# Patient Record
Sex: FEMALE | Race: WHITE | NOT HISPANIC OR LATINO | Employment: UNEMPLOYED | URBAN - NONMETROPOLITAN AREA
[De-identification: names, ages, dates, MRNs, and addresses within clinical notes are randomized per-mention and may not be internally consistent; named-entity substitution may affect disease eponyms.]

---

## 2019-07-01 ENCOUNTER — OFFICE VISIT (OUTPATIENT)
Dept: FAMILY MEDICINE CLINIC | Facility: CLINIC | Age: 24
End: 2019-07-01
Payer: COMMERCIAL

## 2019-07-01 VITALS
SYSTOLIC BLOOD PRESSURE: 118 MMHG | DIASTOLIC BLOOD PRESSURE: 68 MMHG | WEIGHT: 197 LBS | HEIGHT: 64 IN | BODY MASS INDEX: 33.63 KG/M2

## 2019-07-01 DIAGNOSIS — Z30.019 ENCOUNTER FOR INITIAL PRESCRIPTION OF CONTRACEPTIVES, UNSPECIFIED CONTRACEPTIVE: ICD-10-CM

## 2019-07-01 PROCEDURE — 99213 OFFICE O/P EST LOW 20 MIN: CPT | Performed by: FAMILY MEDICINE

## 2019-07-01 PROCEDURE — 3008F BODY MASS INDEX DOCD: CPT | Performed by: FAMILY MEDICINE

## 2019-07-01 PROCEDURE — 1036F TOBACCO NON-USER: CPT | Performed by: FAMILY MEDICINE

## 2019-07-01 RX ORDER — FLUOXETINE HYDROCHLORIDE 20 MG/5ML
20 LIQUID ORAL DAILY
COMMUNITY
End: 2019-07-01 | Stop reason: CLARIF

## 2019-07-01 RX ORDER — FLUOXETINE 20 MG/1
TABLET, FILM COATED ORAL
Qty: 15 TABLET | Refills: 0 | Status: SHIPPED | OUTPATIENT
Start: 2019-07-01 | End: 2020-02-19 | Stop reason: ALTCHOICE

## 2019-07-01 NOTE — PROGRESS NOTES
Assessment/Plan:  Patient is encouraged to visit with gynecology for some form of birth control in the interim were going to start her on every other day dose of Prozac so we can start a very slow weaning process in [place] she would conceive again    Problem List Items Addressed This Visit     None      Visit Diagnoses     Postpartum depression    -  Primary           Diagnoses and all orders for this visit:    Postpartum depression    Other orders  -     Discontinue: FLUoxetine (PROzac) 20 mg/5 mL solution; Take 20 mg by mouth daily        No problem-specific Assessment & Plan notes found for this encounter  Subjective:      Patient ID: Kenia Howell is a 27 y o  female  Patient is here to reestablish herself as a patient she moved to [place] she is on her 3rd child right now takes Prozac which was for postpartum depression with anxiety on has been on a daily dose not using any birth control I told her my concern would be that we start to wean the Prozac and K she would become pregnant again recommended on going to every other day Prozac dispense 15 pills also will get her set up with Gynecology for possible birth control      The following portions of the patient's history were reviewed and updated as appropriate:   She has a past medical history of Ear disease, Hearing loss, and Insomnia ,  does not have a problem list on file  ,   has a past surgical history that includes Tympanostomy tube placement (Right); TONSILECTOMY AND ADNOIDECTOMY; and EAR SURGERY (Left)  ,  family history includes Allergies in her mother; Diabetes type II in her paternal grandfather  ,   reports that she quit smoking about 6 years ago  She quit after 3 00 years of use  She has never used smokeless tobacco  She reports that she drank alcohol  She reports that she does not use drugs  ,  is allergic to augmentin [amoxicillin-pot clavulanate] and bactrim [sulfamethoxazole-trimethoprim]     No current outpatient medications on file       No current facility-administered medications for this visit  Review of Systems   Constitutional: Negative for activity change, appetite change, diaphoresis, fatigue and fever  HENT: Negative  Eyes: Negative  Respiratory: Negative for apnea, cough, chest tightness, shortness of breath and wheezing  Cardiovascular: Negative for chest pain, palpitations and leg swelling  Gastrointestinal: Negative for abdominal distention, abdominal pain, anal bleeding, constipation, diarrhea, nausea and vomiting  Endocrine: Negative for cold intolerance, heat intolerance, polydipsia, polyphagia and polyuria  Genitourinary: Negative for difficulty urinating, dysuria, flank pain, hematuria and urgency  Musculoskeletal: Negative for arthralgias, back pain, gait problem, joint swelling and myalgias  Skin: Negative for color change, rash and wound  Allergic/Immunologic: Negative for environmental allergies, food allergies and immunocompromised state  Neurological: Negative for dizziness, seizures, syncope, speech difficulty, numbness and headaches  Hematological: Negative for adenopathy  Does not bruise/bleed easily  Psychiatric/Behavioral: Positive for dysphoric mood  Negative for agitation, behavioral problems, hallucinations, sleep disturbance and suicidal ideas  Objective:  Vitals:    07/01/19 1548   BP: 118/68   BP Location: Right arm   Patient Position: Sitting   Cuff Size: Standard   Weight: 89 4 kg (197 lb)   Height: 5' 4" (1 626 m)     Body mass index is 33 81 kg/m²  Physical Exam   Constitutional: She is oriented to person, place, and time  She appears well-developed and well-nourished  No distress  HENT:   Head: Normocephalic  Right Ear: External ear normal    Left Ear: External ear normal    Nose: Nose normal    Mouth/Throat: Oropharynx is clear and moist    Eyes: Pupils are equal, round, and reactive to light  Conjunctivae and EOM are normal  Right eye exhibits no discharge  Left eye exhibits no discharge  No scleral icterus  Neck: Normal range of motion  No tracheal deviation present  No thyromegaly present  Cardiovascular: Normal rate, regular rhythm and normal heart sounds  Exam reveals no gallop and no friction rub  No murmur heard  Pulmonary/Chest: Effort normal and breath sounds normal  No respiratory distress  She has no wheezes  Abdominal: Soft  Bowel sounds are normal  She exhibits no mass  There is no tenderness  There is no guarding  Musculoskeletal: She exhibits no edema or deformity  Lymphadenopathy:     She has no cervical adenopathy  Neurological: She is alert and oriented to person, place, and time  No cranial nerve deficit  Skin: Skin is warm and dry  No rash noted  She is not diaphoretic  No erythema  Psychiatric: She has a normal mood and affect   Thought content normal

## 2019-11-15 ENCOUNTER — VBI (OUTPATIENT)
Dept: FAMILY MEDICINE CLINIC | Facility: CLINIC | Age: 24
End: 2019-11-15

## 2020-02-19 ENCOUNTER — TELEPHONE (OUTPATIENT)
Dept: SLEEP CENTER | Facility: CLINIC | Age: 25
End: 2020-02-19

## 2020-02-19 ENCOUNTER — LAB (OUTPATIENT)
Dept: LAB | Facility: MEDICAL CENTER | Age: 25
End: 2020-02-19
Payer: COMMERCIAL

## 2020-02-19 ENCOUNTER — OFFICE VISIT (OUTPATIENT)
Dept: FAMILY MEDICINE CLINIC | Facility: CLINIC | Age: 25
End: 2020-02-19
Payer: COMMERCIAL

## 2020-02-19 VITALS
WEIGHT: 219.4 LBS | SYSTOLIC BLOOD PRESSURE: 128 MMHG | HEIGHT: 64 IN | DIASTOLIC BLOOD PRESSURE: 86 MMHG | BODY MASS INDEX: 37.46 KG/M2

## 2020-02-19 DIAGNOSIS — H81.4 VERTIGO OF CENTRAL ORIGIN: ICD-10-CM

## 2020-02-19 DIAGNOSIS — J11.1 INFLUENZA: ICD-10-CM

## 2020-02-19 DIAGNOSIS — Z12.4 ENCOUNTER FOR SCREENING FOR CERVICAL CANCER: Primary | ICD-10-CM

## 2020-02-19 DIAGNOSIS — R42 DIZZINESS: ICD-10-CM

## 2020-02-19 DIAGNOSIS — H55.09 LATERAL NYSTAGMUS: ICD-10-CM

## 2020-02-19 DIAGNOSIS — Z13.220 SCREENING FOR HYPERLIPIDEMIA: ICD-10-CM

## 2020-02-19 DIAGNOSIS — H66.90 CHRONIC OTITIS MEDIA, UNSPECIFIED OTITIS MEDIA TYPE: ICD-10-CM

## 2020-02-19 DIAGNOSIS — R53.82 CHRONIC FATIGUE: ICD-10-CM

## 2020-02-19 LAB
ALBUMIN SERPL BCP-MCNC: 3.7 G/DL (ref 3.5–5)
ALP SERPL-CCNC: 84 U/L (ref 46–116)
ALT SERPL W P-5'-P-CCNC: 40 U/L (ref 12–78)
ANION GAP SERPL CALCULATED.3IONS-SCNC: 4 MMOL/L (ref 4–13)
AST SERPL W P-5'-P-CCNC: 18 U/L (ref 5–45)
BILIRUB SERPL-MCNC: 0.18 MG/DL (ref 0.2–1)
BUN SERPL-MCNC: 10 MG/DL (ref 5–25)
CALCIUM SERPL-MCNC: 8.8 MG/DL (ref 8.3–10.1)
CHLORIDE SERPL-SCNC: 110 MMOL/L (ref 100–108)
CHOLEST SERPL-MCNC: 143 MG/DL (ref 50–200)
CO2 SERPL-SCNC: 27 MMOL/L (ref 21–32)
CREAT SERPL-MCNC: 0.67 MG/DL (ref 0.6–1.3)
ERYTHROCYTE [DISTWIDTH] IN BLOOD BY AUTOMATED COUNT: 13.9 % (ref 11.6–15.1)
GFR SERPL CREATININE-BSD FRML MDRD: 125 ML/MIN/1.73SQ M
GLUCOSE P FAST SERPL-MCNC: 77 MG/DL (ref 65–99)
HCT VFR BLD AUTO: 40.9 % (ref 34.8–46.1)
HDLC SERPL-MCNC: 35 MG/DL
HGB BLD-MCNC: 12.9 G/DL (ref 11.5–15.4)
LDLC SERPL CALC-MCNC: 92 MG/DL (ref 0–100)
MCH RBC QN AUTO: 29.3 PG (ref 26.8–34.3)
MCHC RBC AUTO-ENTMCNC: 31.5 G/DL (ref 31.4–37.4)
MCV RBC AUTO: 93 FL (ref 82–98)
NONHDLC SERPL-MCNC: 108 MG/DL
PLATELET # BLD AUTO: 284 THOUSANDS/UL (ref 149–390)
PMV BLD AUTO: 10.4 FL (ref 8.9–12.7)
POTASSIUM SERPL-SCNC: 4.3 MMOL/L (ref 3.5–5.3)
PROT SERPL-MCNC: 7.4 G/DL (ref 6.4–8.2)
RBC # BLD AUTO: 4.4 MILLION/UL (ref 3.81–5.12)
SODIUM SERPL-SCNC: 141 MMOL/L (ref 136–145)
TRIGL SERPL-MCNC: 81 MG/DL
TSH SERPL DL<=0.05 MIU/L-ACNC: 0.77 UIU/ML (ref 0.36–3.74)
WBC # BLD AUTO: 4.48 THOUSAND/UL (ref 4.31–10.16)

## 2020-02-19 PROCEDURE — 1036F TOBACCO NON-USER: CPT | Performed by: FAMILY MEDICINE

## 2020-02-19 PROCEDURE — 86618 LYME DISEASE ANTIBODY: CPT | Performed by: FAMILY MEDICINE

## 2020-02-19 PROCEDURE — 99213 OFFICE O/P EST LOW 20 MIN: CPT | Performed by: FAMILY MEDICINE

## 2020-02-19 PROCEDURE — 80061 LIPID PANEL: CPT | Performed by: FAMILY MEDICINE

## 2020-02-19 PROCEDURE — 87631 RESP VIRUS 3-5 TARGETS: CPT

## 2020-02-19 PROCEDURE — 85027 COMPLETE CBC AUTOMATED: CPT | Performed by: FAMILY MEDICINE

## 2020-02-19 PROCEDURE — 84443 ASSAY THYROID STIM HORMONE: CPT | Performed by: FAMILY MEDICINE

## 2020-02-19 PROCEDURE — 36415 COLL VENOUS BLD VENIPUNCTURE: CPT | Performed by: FAMILY MEDICINE

## 2020-02-19 PROCEDURE — 80053 COMPREHEN METABOLIC PANEL: CPT | Performed by: FAMILY MEDICINE

## 2020-02-19 NOTE — PROGRESS NOTES
BMI Counseling: Body mass index is 37 66 kg/m²  The BMI is above normal  Nutrition recommendations include decreasing portion sizes, encouraging healthy choices of fruits and vegetables, decreasing fast food intake, consuming healthier snacks, moderation in carbohydrate intake and increasing intake of lean protein  Exercise recommendations include moderate physical activity 150 minutes/week  No pharmacotherapy was ordered  Patient referred to PCP due to patient being overweight  Assessment/Plan:    Problem List Items Addressed This Visit     None      Visit Diagnoses     Encounter for screening for cervical cancer     -  Primary    Relevant Orders    Ambulatory referral to Gynecology    Dizziness               Diagnoses and all orders for this visit:    Encounter for screening for cervical cancer   -     Ambulatory referral to Gynecology; Future    Dizziness        No problem-specific Assessment & Plan notes found for this encounter  Subjective:      Patient ID: Emerson Gary is a 27 y o  female  Sheryl Laresgrant is here today with a couple of issues on she is dizzy she does not describe it as true vertigo just a sensation of dizziness which is frequent almost on a daily basis lasts about an hour not associated with hypoglycemia no recent head trauma but patient has just an off-balance sensation 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 no head trauma that she knows of on interestingly during the exam she has lateral nystagmus almost constantly during the exam      The following portions of the patient's history were reviewed and updated as appropriate:   She has a past medical history of Ear disease, Hearing loss, and Insomnia ,  does not have any pertinent problems on file  ,   has a past surgical history that includes Tympanostomy tube placement (Right); TONSILECTOMY AND ADNOIDECTOMY; and EAR SURGERY (Left)  ,  family history includes Allergies in her mother; Diabetes type II in her paternal grandfather  , reports that she quit smoking about 7 years ago  She quit after 3 00 years of use  She has never used smokeless tobacco  She reports that she drank alcohol  She reports that she does not use drugs  ,  is allergic to augmentin [amoxicillin-pot clavulanate]; bactrim [sulfamethoxazole-trimethoprim]; and coconut oil     No current outpatient medications on file  No current facility-administered medications for this visit  Review of Systems   Constitutional: Positive for activity change  Negative for appetite change, diaphoresis, fatigue and fever  HENT: Negative  Eyes: Negative  Respiratory: Negative for apnea, cough, chest tightness, shortness of breath and wheezing  Cardiovascular: Negative for chest pain, palpitations and leg swelling  Gastrointestinal: Negative for abdominal distention, abdominal pain, anal bleeding, constipation, diarrhea, nausea and vomiting  Endocrine: Negative for cold intolerance, heat intolerance, polydipsia, polyphagia and polyuria  Genitourinary: Negative for difficulty urinating, dysuria, flank pain, hematuria and urgency  Musculoskeletal: Negative for arthralgias, back pain, gait problem, joint swelling and myalgias  Skin: Negative for color change, rash and wound  Allergic/Immunologic: Negative for environmental allergies, food allergies and immunocompromised state  Neurological: Positive for dizziness  Negative for seizures, syncope, speech difficulty, numbness and headaches  Hematological: Negative for adenopathy  Does not bruise/bleed easily  Psychiatric/Behavioral: Negative for agitation, behavioral problems, hallucinations, sleep disturbance and suicidal ideas  Objective:  Vitals:    02/19/20 0949   BP: 128/86   BP Location: Left arm   Patient Position: Sitting   Weight: 99 5 kg (219 lb 6 4 oz)   Height: 5' 4" (1 626 m)     Body mass index is 37 66 kg/m²  Physical Exam   Constitutional: She is oriented to person, place, and time   She appears well-developed and well-nourished  No distress  HENT:   Head: Normocephalic  Right Ear: External ear normal    Left Ear: External ear normal    Nose: Nose normal    Mouth/Throat: Oropharynx is clear and moist    Eyes: Pupils are equal, round, and reactive to light  Conjunctivae and EOM are normal  Right eye exhibits no discharge  Left eye exhibits no discharge  No scleral icterus  Neck: Normal range of motion  No tracheal deviation present  No thyromegaly present  Cardiovascular: Normal rate, regular rhythm and normal heart sounds  Exam reveals no gallop and no friction rub  No murmur heard  Pulmonary/Chest: Effort normal and breath sounds normal  No respiratory distress  She has no wheezes  Abdominal: Soft  Bowel sounds are normal  She exhibits no mass  There is no tenderness  There is no guarding  Musculoskeletal: She exhibits no edema or deformity  Lymphadenopathy:     She has no cervical adenopathy  Neurological: She is alert and oriented to person, place, and time  No cranial nerve deficit  Skin: Skin is warm and dry  No rash noted  She is not diaphoretic  No erythema  Psychiatric: She has a normal mood and affect   Thought content normal

## 2020-02-19 NOTE — TELEPHONE ENCOUNTER
I called and left message for the patient to call the office back regarding about scheduling an appointment with ENT

## 2020-02-20 LAB
B BURGDOR IGG+IGM SER-ACNC: <0.91 ISR (ref 0–0.9)
FLUAV RNA NPH QL NAA+PROBE: ABNORMAL
FLUBV RNA NPH QL NAA+PROBE: DETECTED
RSV RNA NPH QL NAA+PROBE: ABNORMAL

## 2020-02-20 NOTE — RESULT ENCOUNTER NOTE
Call patient to notify normal results all the lab work so far is totally normal chemistry panel is good blood sugar is good liver function good kidney function good thyroid good cholesterol good

## 2020-02-26 ENCOUNTER — HOSPITAL ENCOUNTER (OUTPATIENT)
Dept: MRI IMAGING | Facility: HOSPITAL | Age: 25
Discharge: HOME | End: 2020-02-26
Attending: FAMILY MEDICINE
Payer: COMMERCIAL

## 2020-02-26 DIAGNOSIS — R42 DIZZINESS: ICD-10-CM

## 2020-02-26 PROCEDURE — 70551 MRI BRAIN STEM W/O DYE: CPT

## 2020-02-28 NOTE — RESULT ENCOUNTER NOTE
Call patient to notify normal results no abnormality of the brain there are some postsurgical changes of the left side of the skull but otherwise none absolutely normal

## 2020-04-13 ENCOUNTER — HOSPITAL ENCOUNTER (EMERGENCY)
Facility: HOSPITAL | Age: 25
Discharge: HOME | End: 2020-04-13
Attending: EMERGENCY MEDICINE | Admitting: EMERGENCY MEDICINE
Payer: COMMERCIAL

## 2020-04-13 VITALS
WEIGHT: 227.51 LBS | SYSTOLIC BLOOD PRESSURE: 134 MMHG | DIASTOLIC BLOOD PRESSURE: 61 MMHG | TEMPERATURE: 98.3 F | OXYGEN SATURATION: 98 % | RESPIRATION RATE: 20 BRPM | BODY MASS INDEX: 39.05 KG/M2 | HEART RATE: 77 BPM

## 2020-04-13 DIAGNOSIS — R10.2 PELVIC CRAMPING: ICD-10-CM

## 2020-04-13 DIAGNOSIS — N94.6 CRAMPY PAIN ASSOCIATED WITH MENSES: ICD-10-CM

## 2020-04-13 DIAGNOSIS — R10.9 ABDOMINAL PAIN: Primary | ICD-10-CM

## 2020-04-13 LAB
BACTERIA UR QL AUTO: ABNORMAL /HPF
BILIRUB UR QL STRIP: NEGATIVE
CLARITY UR: ABNORMAL
COLOR UR: ABNORMAL
EXT PREG TEST URINE: NEGATIVE
EXT. CONTROL ED NAV: NORMAL
GLUCOSE SERPL-MCNC: 90 MG/DL (ref 65–140)
GLUCOSE UR STRIP-MCNC: NEGATIVE MG/DL
HGB UR QL STRIP.AUTO: ABNORMAL
KETONES UR STRIP-MCNC: NEGATIVE MG/DL
LEUKOCYTE ESTERASE UR QL STRIP: NEGATIVE
NITRITE UR QL STRIP: NEGATIVE
NON-SQ EPI CELLS URNS QL MICRO: ABNORMAL /HPF
PH UR STRIP.AUTO: 7 [PH]
PROT UR STRIP-MCNC: ABNORMAL MG/DL
RBC #/AREA URNS AUTO: ABNORMAL /HPF
SP GR UR STRIP.AUTO: 1.02 (ref 1–1.03)
UROBILINOGEN UR QL STRIP.AUTO: 0.2 E.U./DL
WBC #/AREA URNS AUTO: ABNORMAL /HPF

## 2020-04-13 PROCEDURE — 99284 EMERGENCY DEPT VISIT MOD MDM: CPT

## 2020-04-13 PROCEDURE — 81001 URINALYSIS AUTO W/SCOPE: CPT | Performed by: EMERGENCY MEDICINE

## 2020-04-13 PROCEDURE — 82948 REAGENT STRIP/BLOOD GLUCOSE: CPT

## 2020-04-13 PROCEDURE — 87147 CULTURE TYPE IMMUNOLOGIC: CPT | Performed by: EMERGENCY MEDICINE

## 2020-04-13 PROCEDURE — 99284 EMERGENCY DEPT VISIT MOD MDM: CPT | Performed by: EMERGENCY MEDICINE

## 2020-04-13 PROCEDURE — 81025 URINE PREGNANCY TEST: CPT | Performed by: EMERGENCY MEDICINE

## 2020-04-13 PROCEDURE — 87086 URINE CULTURE/COLONY COUNT: CPT | Performed by: EMERGENCY MEDICINE

## 2020-04-13 RX ORDER — DICYCLOMINE HCL 20 MG
20 TABLET ORAL ONCE
Status: COMPLETED | OUTPATIENT
Start: 2020-04-13 | End: 2020-04-13

## 2020-04-13 RX ORDER — NAPROXEN 375 MG/1
375 TABLET ORAL 2 TIMES DAILY WITH MEALS
Qty: 6 TABLET | Refills: 0 | Status: SHIPPED | OUTPATIENT
Start: 2020-04-13 | End: 2020-12-16

## 2020-04-13 RX ORDER — ONDANSETRON 4 MG/1
4 TABLET, FILM COATED ORAL EVERY 12 HOURS PRN
Qty: 2 TABLET | Refills: 0 | Status: SHIPPED | OUTPATIENT
Start: 2020-04-13 | End: 2020-12-16

## 2020-04-13 RX ORDER — ONDANSETRON 4 MG/1
4 TABLET, ORALLY DISINTEGRATING ORAL ONCE
Status: COMPLETED | OUTPATIENT
Start: 2020-04-13 | End: 2020-04-13

## 2020-04-13 RX ADMIN — DICYCLOMINE HYDROCHLORIDE 20 MG: 20 TABLET ORAL at 11:34

## 2020-04-13 RX ADMIN — ONDANSETRON 4 MG: 4 TABLET, ORALLY DISINTEGRATING ORAL at 11:34

## 2020-04-14 LAB — BACTERIA UR CULT: ABNORMAL

## 2020-04-21 DIAGNOSIS — N30.00 ACUTE CYSTITIS WITHOUT HEMATURIA: Primary | ICD-10-CM

## 2020-04-21 RX ORDER — NITROFURANTOIN 25; 75 MG/1; MG/1
100 CAPSULE ORAL 2 TIMES DAILY
Qty: 10 CAPSULE | Refills: 0 | Status: SHIPPED | OUTPATIENT
Start: 2020-04-21 | End: 2020-04-26

## 2020-05-13 ENCOUNTER — TELEPHONE (OUTPATIENT)
Dept: NEUROLOGY | Facility: CLINIC | Age: 25
End: 2020-05-13

## 2020-05-14 ENCOUNTER — TELEPHONE (OUTPATIENT)
Dept: NEUROLOGY | Facility: CLINIC | Age: 25
End: 2020-05-14

## 2020-05-15 ENCOUNTER — CONSULT (OUTPATIENT)
Dept: NEUROLOGY | Facility: CLINIC | Age: 25
End: 2020-05-15
Payer: COMMERCIAL

## 2020-05-15 VITALS
HEART RATE: 89 BPM | RESPIRATION RATE: 18 BRPM | BODY MASS INDEX: 43.23 KG/M2 | HEIGHT: 61 IN | TEMPERATURE: 97.9 F | DIASTOLIC BLOOD PRESSURE: 72 MMHG | SYSTOLIC BLOOD PRESSURE: 134 MMHG | WEIGHT: 229 LBS

## 2020-05-15 DIAGNOSIS — R29.818 TRANSIENT NEUROLOGICAL SYMPTOMS: Primary | ICD-10-CM

## 2020-05-15 DIAGNOSIS — H91.90 HEARING LOSS, UNSPECIFIED HEARING LOSS TYPE, UNSPECIFIED LATERALITY: ICD-10-CM

## 2020-05-15 DIAGNOSIS — R42 DIZZINESS: ICD-10-CM

## 2020-05-15 PROCEDURE — 99243 OFF/OP CNSLTJ NEW/EST LOW 30: CPT | Performed by: PSYCHIATRY & NEUROLOGY

## 2020-05-15 PROCEDURE — 3008F BODY MASS INDEX DOCD: CPT | Performed by: PSYCHIATRY & NEUROLOGY

## 2020-05-26 ENCOUNTER — HOSPITAL ENCOUNTER (OUTPATIENT)
Dept: NEUROLOGY | Facility: HOSPITAL | Age: 25
Discharge: HOME | End: 2020-05-26
Attending: PSYCHIATRY & NEUROLOGY
Payer: COMMERCIAL

## 2020-05-26 DIAGNOSIS — R29.818 TRANSIENT NEUROLOGICAL SYMPTOMS: ICD-10-CM

## 2020-05-26 DIAGNOSIS — R42 DIZZINESS: ICD-10-CM

## 2020-05-26 PROCEDURE — 95819 EEG AWAKE AND ASLEEP: CPT

## 2020-05-26 PROCEDURE — 95819 EEG AWAKE AND ASLEEP: CPT | Performed by: PSYCHIATRY & NEUROLOGY

## 2020-06-03 ENCOUNTER — HOSPITAL ENCOUNTER (OUTPATIENT)
Dept: NEUROLOGY | Facility: CLINIC | Age: 25
Discharge: HOME | End: 2020-06-03
Payer: COMMERCIAL

## 2020-06-03 DIAGNOSIS — R42 DIZZINESS: ICD-10-CM

## 2020-06-03 DIAGNOSIS — H91.90 HEARING LOSS, UNSPECIFIED HEARING LOSS TYPE, UNSPECIFIED LATERALITY: ICD-10-CM

## 2020-06-03 PROCEDURE — 92585 PR AUDITORY EVOKED POTENTIAL: CPT | Performed by: PSYCHIATRY & NEUROLOGY

## 2020-06-03 PROCEDURE — 92585 HB AUDITOR EVOKE POTENT COMPRE: CPT

## 2020-06-11 ENCOUNTER — TELEPHONE (OUTPATIENT)
Dept: NEUROLOGY | Facility: CLINIC | Age: 25
End: 2020-06-11

## 2020-06-17 ENCOUNTER — TELEPHONE (OUTPATIENT)
Dept: NEUROLOGY | Facility: CLINIC | Age: 25
End: 2020-06-17

## 2020-12-15 ENCOUNTER — HOSPITAL ENCOUNTER (EMERGENCY)
Facility: HOSPITAL | Age: 25
Discharge: HOME | End: 2020-12-16
Attending: EMERGENCY MEDICINE
Payer: COMMERCIAL

## 2020-12-15 DIAGNOSIS — L50.9 URTICARIA: Primary | ICD-10-CM

## 2020-12-15 LAB
EXT PREG TEST URINE: NEGATIVE
EXT. CONTROL ED NAV: NORMAL

## 2020-12-15 PROCEDURE — 99283 EMERGENCY DEPT VISIT LOW MDM: CPT

## 2020-12-15 PROCEDURE — 99284 EMERGENCY DEPT VISIT MOD MDM: CPT | Performed by: EMERGENCY MEDICINE

## 2020-12-15 PROCEDURE — 96374 THER/PROPH/DIAG INJ IV PUSH: CPT

## 2020-12-15 PROCEDURE — 81025 URINE PREGNANCY TEST: CPT | Performed by: EMERGENCY MEDICINE

## 2020-12-15 PROCEDURE — 96375 TX/PRO/DX INJ NEW DRUG ADDON: CPT

## 2020-12-15 RX ORDER — DIPHENHYDRAMINE HYDROCHLORIDE 50 MG/ML
25 INJECTION INTRAMUSCULAR; INTRAVENOUS ONCE
Status: COMPLETED | OUTPATIENT
Start: 2020-12-15 | End: 2020-12-15

## 2020-12-15 RX ORDER — METHYLPREDNISOLONE SODIUM SUCCINATE 125 MG/2ML
80 INJECTION, POWDER, LYOPHILIZED, FOR SOLUTION INTRAMUSCULAR; INTRAVENOUS ONCE
Status: COMPLETED | OUTPATIENT
Start: 2020-12-15 | End: 2020-12-15

## 2020-12-15 RX ADMIN — FAMOTIDINE 20 MG: 10 INJECTION INTRAVENOUS at 23:13

## 2020-12-15 RX ADMIN — METHYLPREDNISOLONE SODIUM SUCCINATE 80 MG: 125 INJECTION, POWDER, FOR SOLUTION INTRAMUSCULAR; INTRAVENOUS at 23:16

## 2020-12-15 RX ADMIN — DIPHENHYDRAMINE HYDROCHLORIDE 25 MG: 50 INJECTION, SOLUTION INTRAMUSCULAR; INTRAVENOUS at 23:14

## 2020-12-16 ENCOUNTER — OFFICE VISIT (OUTPATIENT)
Dept: FAMILY MEDICINE CLINIC | Facility: CLINIC | Age: 25
End: 2020-12-16
Payer: COMMERCIAL

## 2020-12-16 VITALS
TEMPERATURE: 97.9 F | DIASTOLIC BLOOD PRESSURE: 58 MMHG | OXYGEN SATURATION: 98 % | WEIGHT: 231.48 LBS | HEART RATE: 77 BPM | BODY MASS INDEX: 39.52 KG/M2 | SYSTOLIC BLOOD PRESSURE: 125 MMHG | HEIGHT: 64 IN | RESPIRATION RATE: 16 BRPM

## 2020-12-16 VITALS
TEMPERATURE: 97.3 F | BODY MASS INDEX: 38.48 KG/M2 | HEART RATE: 83 BPM | DIASTOLIC BLOOD PRESSURE: 96 MMHG | WEIGHT: 225.4 LBS | SYSTOLIC BLOOD PRESSURE: 140 MMHG | OXYGEN SATURATION: 97 % | HEIGHT: 64 IN

## 2020-12-16 DIAGNOSIS — L50.9 URTICARIA: Primary | ICD-10-CM

## 2020-12-16 PROCEDURE — 99213 OFFICE O/P EST LOW 20 MIN: CPT | Performed by: PHYSICIAN ASSISTANT

## 2020-12-16 PROCEDURE — 3008F BODY MASS INDEX DOCD: CPT | Performed by: PHYSICIAN ASSISTANT

## 2020-12-16 PROCEDURE — 3725F SCREEN DEPRESSION PERFORMED: CPT | Performed by: PHYSICIAN ASSISTANT

## 2020-12-16 PROCEDURE — 1036F TOBACCO NON-USER: CPT | Performed by: PHYSICIAN ASSISTANT

## 2020-12-16 RX ORDER — PREDNISONE 20 MG/1
40 TABLET ORAL DAILY
Qty: 4 TABLET | Refills: 0 | Status: SHIPPED | OUTPATIENT
Start: 2020-12-16 | End: 2020-12-18

## 2020-12-17 ENCOUNTER — HOSPITAL ENCOUNTER (EMERGENCY)
Facility: HOSPITAL | Age: 25
Discharge: HOME | End: 2020-12-17
Attending: EMERGENCY MEDICINE | Admitting: EMERGENCY MEDICINE
Payer: COMMERCIAL

## 2020-12-17 DIAGNOSIS — L50.8 RECURRENT URTICARIA: Primary | ICD-10-CM

## 2020-12-17 LAB
ANION GAP SERPL CALCULATED.3IONS-SCNC: 8 MMOL/L (ref 4–13)
BASOPHILS # BLD AUTO: 0.04 THOUSANDS/ΜL (ref 0–0.1)
BASOPHILS NFR BLD AUTO: 0 % (ref 0–1)
BUN SERPL-MCNC: 10 MG/DL (ref 5–25)
CALCIUM SERPL-MCNC: 9.3 MG/DL (ref 8.3–10.1)
CHLORIDE SERPL-SCNC: 103 MMOL/L (ref 100–108)
CO2 SERPL-SCNC: 28 MMOL/L (ref 21–32)
CREAT SERPL-MCNC: 0.82 MG/DL (ref 0.6–1.3)
EOSINOPHIL # BLD AUTO: 0.02 THOUSAND/ΜL (ref 0–0.61)
EOSINOPHIL NFR BLD AUTO: 0 % (ref 0–6)
ERYTHROCYTE [DISTWIDTH] IN BLOOD BY AUTOMATED COUNT: 14.5 % (ref 11.6–15.1)
GFR SERPL CREATININE-BSD FRML MDRD: 101 ML/MIN/1.73SQ M
GLUCOSE SERPL-MCNC: 127 MG/DL (ref 65–140)
HCT VFR BLD AUTO: 40.7 % (ref 34.8–46.1)
HGB BLD-MCNC: 13 G/DL (ref 11.5–15.4)
IMM GRANULOCYTES # BLD AUTO: 0.05 THOUSAND/UL (ref 0–0.2)
IMM GRANULOCYTES NFR BLD AUTO: 0 % (ref 0–2)
LYMPHOCYTES # BLD AUTO: 2.66 THOUSANDS/ΜL (ref 0.6–4.47)
LYMPHOCYTES NFR BLD AUTO: 17 % (ref 14–44)
MAGNESIUM SERPL-MCNC: 2.2 MG/DL (ref 1.6–2.6)
MCH RBC QN AUTO: 29.6 PG (ref 26.8–34.3)
MCHC RBC AUTO-ENTMCNC: 31.9 G/DL (ref 31.4–37.4)
MCV RBC AUTO: 93 FL (ref 82–98)
MONOCYTES # BLD AUTO: 1.15 THOUSAND/ΜL (ref 0.17–1.22)
MONOCYTES NFR BLD AUTO: 7 % (ref 4–12)
NEUTROPHILS # BLD AUTO: 12.03 THOUSANDS/ΜL (ref 1.85–7.62)
NEUTS SEG NFR BLD AUTO: 75 % (ref 43–75)
NRBC BLD AUTO-RTO: 0 /100 WBCS
PLATELET # BLD AUTO: 417 THOUSANDS/UL (ref 149–390)
PMV BLD AUTO: 9.5 FL (ref 8.9–12.7)
POTASSIUM SERPL-SCNC: 3.7 MMOL/L (ref 3.5–5.3)
RBC # BLD AUTO: 4.39 MILLION/UL (ref 3.81–5.12)
SODIUM SERPL-SCNC: 139 MMOL/L (ref 136–145)
T4 FREE SERPL-MCNC: 1.05 NG/DL (ref 0.76–1.46)
TSH SERPL DL<=0.05 MIU/L-ACNC: 0.65 UIU/ML (ref 0.36–3.74)
WBC # BLD AUTO: 15.95 THOUSAND/UL (ref 4.31–10.16)

## 2020-12-17 PROCEDURE — 36415 COLL VENOUS BLD VENIPUNCTURE: CPT | Performed by: EMERGENCY MEDICINE

## 2020-12-17 PROCEDURE — 83735 ASSAY OF MAGNESIUM: CPT | Performed by: EMERGENCY MEDICINE

## 2020-12-17 PROCEDURE — 80048 BASIC METABOLIC PNL TOTAL CA: CPT | Performed by: EMERGENCY MEDICINE

## 2020-12-17 PROCEDURE — 99283 EMERGENCY DEPT VISIT LOW MDM: CPT

## 2020-12-17 PROCEDURE — 96374 THER/PROPH/DIAG INJ IV PUSH: CPT

## 2020-12-17 PROCEDURE — 84439 ASSAY OF FREE THYROXINE: CPT | Performed by: EMERGENCY MEDICINE

## 2020-12-17 PROCEDURE — 85025 COMPLETE CBC W/AUTO DIFF WBC: CPT | Performed by: EMERGENCY MEDICINE

## 2020-12-17 PROCEDURE — 99284 EMERGENCY DEPT VISIT MOD MDM: CPT | Performed by: EMERGENCY MEDICINE

## 2020-12-17 PROCEDURE — 96372 THER/PROPH/DIAG INJ SC/IM: CPT

## 2020-12-17 PROCEDURE — 84443 ASSAY THYROID STIM HORMONE: CPT | Performed by: EMERGENCY MEDICINE

## 2020-12-17 RX ORDER — EPINEPHRINE 1 MG/ML
INJECTION, SOLUTION, CONCENTRATE INTRAVENOUS
Status: COMPLETED
Start: 2020-12-17 | End: 2020-12-17

## 2020-12-17 RX ORDER — DIPHENHYDRAMINE HYDROCHLORIDE 50 MG/ML
INJECTION INTRAMUSCULAR; INTRAVENOUS
Status: COMPLETED
Start: 2020-12-17 | End: 2020-12-17

## 2020-12-17 RX ADMIN — DIPHENHYDRAMINE HYDROCHLORIDE 50 MG: 50 INJECTION, SOLUTION INTRAMUSCULAR; INTRAVENOUS at 01:40

## 2020-12-17 RX ADMIN — EPINEPHRINE 0.3 MG: 1 INJECTION, SOLUTION, CONCENTRATE INTRAVENOUS at 01:30

## 2020-12-21 ENCOUNTER — TELEPHONE (OUTPATIENT)
Dept: FAMILY MEDICINE CLINIC | Facility: CLINIC | Age: 25
End: 2020-12-21

## 2020-12-21 ENCOUNTER — OFFICE VISIT (OUTPATIENT)
Dept: FAMILY MEDICINE CLINIC | Facility: CLINIC | Age: 25
End: 2020-12-21
Payer: COMMERCIAL

## 2020-12-21 VITALS
OXYGEN SATURATION: 99 % | DIASTOLIC BLOOD PRESSURE: 78 MMHG | HEART RATE: 87 BPM | WEIGHT: 229 LBS | TEMPERATURE: 98.4 F | HEIGHT: 64 IN | SYSTOLIC BLOOD PRESSURE: 130 MMHG | BODY MASS INDEX: 39.09 KG/M2

## 2020-12-21 DIAGNOSIS — D72.829 LEUKOCYTOSIS, UNSPECIFIED TYPE: ICD-10-CM

## 2020-12-21 DIAGNOSIS — L50.9 URTICARIA: Primary | ICD-10-CM

## 2020-12-21 PROCEDURE — 99214 OFFICE O/P EST MOD 30 MIN: CPT | Performed by: PHYSICIAN ASSISTANT

## 2020-12-21 PROCEDURE — 3008F BODY MASS INDEX DOCD: CPT | Performed by: PHYSICIAN ASSISTANT

## 2020-12-21 PROCEDURE — 1036F TOBACCO NON-USER: CPT | Performed by: PHYSICIAN ASSISTANT

## 2021-04-04 ENCOUNTER — HOSPITAL ENCOUNTER (EMERGENCY)
Facility: HOSPITAL | Age: 26
Discharge: HOME | End: 2021-04-04
Attending: EMERGENCY MEDICINE
Payer: COMMERCIAL

## 2021-04-04 VITALS
OXYGEN SATURATION: 99 % | BODY MASS INDEX: 40.12 KG/M2 | DIASTOLIC BLOOD PRESSURE: 62 MMHG | WEIGHT: 235 LBS | RESPIRATION RATE: 18 BRPM | TEMPERATURE: 98.6 F | SYSTOLIC BLOOD PRESSURE: 126 MMHG | HEIGHT: 64 IN | HEART RATE: 79 BPM

## 2021-04-04 DIAGNOSIS — N39.0 UTI (URINARY TRACT INFECTION): Primary | ICD-10-CM

## 2021-04-04 LAB
ANION GAP SERPL CALCULATED.3IONS-SCNC: 11 MMOL/L (ref 4–13)
BACTERIA UR QL AUTO: ABNORMAL /HPF
BASOPHILS # BLD AUTO: 0.04 THOUSANDS/ΜL (ref 0–0.1)
BASOPHILS NFR BLD AUTO: 0 % (ref 0–1)
BILIRUB UR QL STRIP: NEGATIVE
BUN SERPL-MCNC: 10 MG/DL (ref 5–25)
CALCIUM SERPL-MCNC: 8.8 MG/DL (ref 8.3–10.1)
CHLORIDE SERPL-SCNC: 104 MMOL/L (ref 100–108)
CLARITY UR: CLEAR
CO2 SERPL-SCNC: 25 MMOL/L (ref 21–32)
COLOR UR: YELLOW
CREAT SERPL-MCNC: 0.61 MG/DL (ref 0.6–1.3)
EOSINOPHIL # BLD AUTO: 0.06 THOUSAND/ΜL (ref 0–0.61)
EOSINOPHIL NFR BLD AUTO: 1 % (ref 0–6)
ERYTHROCYTE [DISTWIDTH] IN BLOOD BY AUTOMATED COUNT: 14.5 % (ref 11.6–15.1)
GFR SERPL CREATININE-BSD FRML MDRD: 128 ML/MIN/1.73SQ M
GLUCOSE SERPL-MCNC: 78 MG/DL (ref 65–140)
GLUCOSE UR STRIP-MCNC: NEGATIVE MG/DL
HCT VFR BLD AUTO: 37.8 % (ref 34.8–46.1)
HGB BLD-MCNC: 12 G/DL (ref 11.5–15.4)
HGB UR QL STRIP.AUTO: NEGATIVE
IMM GRANULOCYTES # BLD AUTO: 0.04 THOUSAND/UL (ref 0–0.2)
IMM GRANULOCYTES NFR BLD AUTO: 0 % (ref 0–2)
KETONES UR STRIP-MCNC: NEGATIVE MG/DL
LEUKOCYTE ESTERASE UR QL STRIP: ABNORMAL
LYMPHOCYTES # BLD AUTO: 1.63 THOUSANDS/ΜL (ref 0.6–4.47)
LYMPHOCYTES NFR BLD AUTO: 17 % (ref 14–44)
MCH RBC QN AUTO: 29 PG (ref 26.8–34.3)
MCHC RBC AUTO-ENTMCNC: 31.7 G/DL (ref 31.4–37.4)
MCV RBC AUTO: 91 FL (ref 82–98)
MONOCYTES # BLD AUTO: 0.75 THOUSAND/ΜL (ref 0.17–1.22)
MONOCYTES NFR BLD AUTO: 8 % (ref 4–12)
NEUTROPHILS # BLD AUTO: 7.3 THOUSANDS/ΜL (ref 1.85–7.62)
NEUTS SEG NFR BLD AUTO: 74 % (ref 43–75)
NITRITE UR QL STRIP: NEGATIVE
NON-SQ EPI CELLS URNS QL MICRO: ABNORMAL /HPF
NRBC BLD AUTO-RTO: 0 /100 WBCS
PH UR STRIP.AUTO: 7.5 [PH]
PLATELET # BLD AUTO: 305 THOUSANDS/UL (ref 149–390)
PMV BLD AUTO: 10 FL (ref 8.9–12.7)
POTASSIUM SERPL-SCNC: 3.6 MMOL/L (ref 3.5–5.3)
PROT UR STRIP-MCNC: NEGATIVE MG/DL
RBC # BLD AUTO: 4.14 MILLION/UL (ref 3.81–5.12)
RBC #/AREA URNS AUTO: ABNORMAL /HPF
SODIUM SERPL-SCNC: 140 MMOL/L (ref 136–145)
SP GR UR STRIP.AUTO: 1.02 (ref 1–1.03)
UROBILINOGEN UR QL STRIP.AUTO: 0.2 E.U./DL
WBC # BLD AUTO: 9.82 THOUSAND/UL (ref 4.31–10.16)
WBC #/AREA URNS AUTO: ABNORMAL /HPF

## 2021-04-04 PROCEDURE — 87086 URINE CULTURE/COLONY COUNT: CPT | Performed by: EMERGENCY MEDICINE

## 2021-04-04 PROCEDURE — 80048 BASIC METABOLIC PNL TOTAL CA: CPT | Performed by: EMERGENCY MEDICINE

## 2021-04-04 PROCEDURE — 81001 URINALYSIS AUTO W/SCOPE: CPT | Performed by: EMERGENCY MEDICINE

## 2021-04-04 PROCEDURE — 85025 COMPLETE CBC W/AUTO DIFF WBC: CPT | Performed by: EMERGENCY MEDICINE

## 2021-04-04 PROCEDURE — 96360 HYDRATION IV INFUSION INIT: CPT

## 2021-04-04 PROCEDURE — 36415 COLL VENOUS BLD VENIPUNCTURE: CPT | Performed by: EMERGENCY MEDICINE

## 2021-04-04 PROCEDURE — 99283 EMERGENCY DEPT VISIT LOW MDM: CPT

## 2021-04-04 PROCEDURE — 99284 EMERGENCY DEPT VISIT MOD MDM: CPT | Performed by: EMERGENCY MEDICINE

## 2021-04-04 RX ADMIN — SODIUM CHLORIDE 1000 ML: 0.9 INJECTION, SOLUTION INTRAVENOUS at 15:13

## 2021-04-04 NOTE — ED PROVIDER NOTES
History  Chief Complaint   Patient presents with    Possible UTI     burning with urination started last night with nausea and vomitting; pt reports pregnant approx 10 weeks     HPI  Patient is  at 9 weeks by ultrasound date comes in with burning with urination that started last night, now with nausea vomiting  She has had a slight headache since that time is well  She denies any abdominal pain, denies any swelling of lower legs, denies any contractions, denies any vaginal discharge, denies any gush of fluid from her vagina with bleeding from her vagina  Denies any sick contacts, denies anyone else in the family that is also having nausea vomiting  Denies any history of morning sickness or hyperemesis of previous pregnancies  Patient Otherwise has no fevers chills or sweats denies any cough or other COVID-19 symptoms  None       Past Medical History:   Diagnosis Date    Ear disease     chronic cyst     Hearing loss     Insomnia     Last assessed 7/15/2014        Past Surgical History:   Procedure Laterality Date    EAR SURGERY Left     Foreign body removed     TONSILECTOMY AND ADNOIDECTOMY      TYMPANOSTOMY TUBE PLACEMENT Right        Family History   Problem Relation Age of Onset    Allergies Mother     Diabetes type II Paternal Grandfather      I have reviewed and agree with the history as documented  E-Cigarette/Vaping    E-Cigarette Use Never User      E-Cigarette/Vaping Substances     Social History     Tobacco Use    Smoking status: Former Smoker     Years: 3 00     Quit date:      Years since quittin 2    Smokeless tobacco: Never Used   Substance Use Topics    Alcohol use: Not Currently    Drug use: No       Review of Systems   Constitutional: Negative  Negative for chills and fever  HENT: Negative  Negative for congestion and sore throat  Eyes: Negative  Negative for discharge and redness  Respiratory: Negative    Negative for chest tightness and shortness of breath  Cardiovascular: Negative  Negative for chest pain and palpitations  Gastrointestinal: Positive for nausea and vomiting  Negative for abdominal pain  Endocrine: Negative  Negative for cold intolerance and polyphagia  Genitourinary: Positive for dysuria  Negative for difficulty urinating  Musculoskeletal: Negative  Negative for arthralgias and back pain  Skin: Negative  Negative for color change and wound  Allergic/Immunologic: Negative  Negative for environmental allergies  Neurological: Positive for headaches  Negative for dizziness and weakness  Hematological: Negative  Psychiatric/Behavioral: Negative  Negative for behavioral problems  The patient is not nervous/anxious  All other systems reviewed and are negative  Physical Exam  Physical Exam  Vitals signs and nursing note reviewed  Constitutional:       General: She is not in acute distress  Appearance: She is well-developed  She is not diaphoretic  HENT:      Head: Normocephalic and atraumatic  Right Ear: External ear normal       Left Ear: External ear normal       Nose: No congestion or rhinorrhea  Eyes:      General: No scleral icterus  Right eye: No discharge  Left eye: No discharge  Conjunctiva/sclera: Conjunctivae normal       Pupils: Pupils are equal, round, and reactive to light  Neck:      Musculoskeletal: Normal range of motion and neck supple  No neck rigidity or muscular tenderness  Thyroid: No thyromegaly  Trachea: No tracheal deviation  Cardiovascular:      Rate and Rhythm: Regular rhythm  Heart sounds: No murmur  No friction rub  No gallop  Pulmonary:      Effort: Pulmonary effort is normal  No respiratory distress  Breath sounds: Normal breath sounds  No stridor  No wheezing or rales  Abdominal:      General: Bowel sounds are normal  There is no distension  Palpations: Abdomen is soft  Tenderness:  There is no abdominal tenderness  There is no guarding or rebound  Musculoskeletal: Normal range of motion  General: No tenderness, deformity or signs of injury  Skin:     General: Skin is warm and dry  Findings: No erythema or rash  Neurological:      General: No focal deficit present  Mental Status: She is alert and oriented to person, place, and time  Cranial Nerves: No cranial nerve deficit  Psychiatric:         Behavior: Behavior normal          Thought Content:  Thought content normal          Vital Signs  ED Triage Vitals [04/04/21 1419]   Temperature Pulse Respirations Blood Pressure SpO2   98 6 °F (37 °C) 81 16 144/67 98 %      Temp Source Heart Rate Source Patient Position - Orthostatic VS BP Location FiO2 (%)   Temporal Monitor Sitting Right arm --      Pain Score       8           Vitals:    04/04/21 1419 04/04/21 1430   BP: 144/67 126/62   Pulse: 81 79   Patient Position - Orthostatic VS: Sitting Lying         Visual Acuity  Visual Acuity      Most Recent Value   L Pupil Size (mm)  3   R Pupil Size (mm)  3          ED Medications  Medications   sodium chloride 0 9 % bolus 1,000 mL (0 mL Intravenous Stopped 4/4/21 1605)       Diagnostic Studies  Results Reviewed     Procedure Component Value Units Date/Time    Basic metabolic panel [[id number]] Collected: 04/04/21 1513    Lab Status: Final result Specimen: Blood from Arm, Right Updated: 04/04/21 1528     Sodium 140 mmol/L      Potassium 3 6 mmol/L      Chloride 104 mmol/L      CO2 25 mmol/L      ANION GAP 11 mmol/L      BUN 10 mg/dL      Creatinine 0 61 mg/dL      Glucose 78 mg/dL      Calcium 8 8 mg/dL      eGFR 128 ml/min/1 73sq m     Narrative:      [place] guidelines for Chronic Kidney Disease (CKD):     Stage 1 with normal or high GFR (GFR > 90 mL/min/1 73 square meters)    Stage 2 Mild CKD (GFR = 60-89 mL/min/1 73 square meters)    Stage 3A Moderate CKD (GFR = 45-59 mL/min/1 73 square meters)    Stage 3B Moderate CKD (GFR = 30-44 mL/min/1 73 square meters)    Stage 4 Severe CKD (GFR = 15-29 mL/min/1 73 square meters)    Stage 5 End Stage CKD (GFR <15 mL/min/1 73 square meters)  Note: GFR calculation is accurate only with a steady state creatinine    CBC and differential [[id number]] Collected: 04/04/21 1513    Lab Status: Final result Specimen: Blood from Arm, Right Updated: 04/04/21 1520     WBC 9 82 Thousand/uL      RBC 4 14 Million/uL      Hemoglobin 12 0 g/dL      Hematocrit 37 8 %      MCV 91 fL      MCH 29 0 pg      MCHC 31 7 g/dL      RDW 14 5 %      MPV 10 0 fL      Platelets 663 Thousands/uL      nRBC 0 /100 WBCs      Neutrophils Relative 74 %      Immat GRANS % 0 %      Lymphocytes Relative 17 %      Monocytes Relative 8 %      Eosinophils Relative 1 %      Basophils Relative 0 %      Neutrophils Absolute 7 30 Thousands/µL      Immature Grans Absolute 0 04 Thousand/uL      Lymphocytes Absolute 1 63 Thousands/µL      Monocytes Absolute 0 75 Thousand/µL      Eosinophils Absolute 0 06 Thousand/µL      Basophils Absolute 0 04 Thousands/µL     Urine Microscopic [[id number]]  (Abnormal) Collected: 04/04/21 1501    Lab Status: Final result Specimen: Urine, Clean Catch Updated: 04/04/21 1515     RBC, UA 0-1 /hpf      WBC, UA 2-4 /hpf      Epithelial Cells Moderate /hpf      Bacteria, UA Occasional /hpf      URINE COMMENT --    UA w Reflex to Microscopic w Reflex to Culture [[id number]]  (Abnormal) Collected: 04/04/21 1501    Lab Status: Final result Specimen: Urine, Clean Catch Updated: 04/04/21 1507     Color, UA Yellow     Clarity, UA Clear     Specific [place], UA 1 020     pH, UA 7 5     Leukocytes, UA Trace     Nitrite, UA Negative     Protein, UA Negative mg/dl      Glucose, UA Negative mg/dl      Ketones, UA Negative mg/dl      Urobilinogen, UA 0 2 E U /dl      Bilirubin, UA Negative     Blood, UA Negative     URINE COMMENT --    Urine culture [[id number]] Collected: 04/04/21 1501    Lab Status:  In process Specimen: Urine, Clean Catch Updated: 21 1507                 No orders to display              Procedures  Procedures         ED Course  ED Course as of  010   Sun 2021   1503 ,  2d by US, no abdominal pain  Burning with urination, headache, nausea and vomiting  161 Patient still has a headache, she did take 1 g of Tylenol just prior to arrival   She has normal vital signs normal exam   Her labs are unremarkable she is not dehydrated  I did discuss using Unisom and B6 for her nausea vomiting  She does have a UA that shows trace leuks, occasional bacteria, moderate epithelial cells  Bacteria could be contamination from outside body however given the patient's symptoms of dysuria with the trace leuks will go ahead and treat  1617 Patient has negative urine protein, platelets are normal   I did defer liver enzymes as patient is only 9 weeks pregnant  SBIRT 22yo+      Most Recent Value   SBIRT (22 yo +)   In order to provide better care to our patients, we are screening all of our patients for alcohol and drug use  Would it be okay to ask you these screening questions? Yes Filed at: 2021   Initial Alcohol Screen: US AUDIT-C    1  How often do you have a drink containing alcohol?  0 Filed at: 2021   2  How many drinks containing alcohol do you have on a typical day you are drinking? 0 Filed at: 2021   3a  Male UNDER 65: How often do you have five or more drinks on one occasion? 0 Filed at: 2021   3b  FEMALE Any Age, or MALE 65+: How often do you have 4 or more drinks on one occassion? 0 Filed at: 2021   Audit-C Score  0 Filed at: 2021   MELO: How many times in the past year have you    Used an illegal drug or used a prescription medication for non-medical reasons?   Never Filed at: 2021                    MDM  Number of Diagnoses or Management Options  UTI (urinary tract infection):   Diagnosis management comments: Well-appearing nontoxic female comes in tightness with dysuria  She had Tylenol just prior to arrival   Will get some lab work make sure she is not dehydrated, fissures she is not a ptosis  Will give a L of fluid  Disposition  Final diagnoses:   UTI (urinary tract infection)     Time reflects when diagnosis was documented in both MDM as applicable and the Disposition within this note     Time User Action Codes Description Comment    4/4/2021  4:17 PM Jeff Paniagua Add [N39 0] UTI (urinary tract infection)       ED Disposition     ED Disposition Condition Date/Time Comment    Discharge Stable Sun Apr 4, 2021  4:17 PM Hadley Jeong discharge to home/self care  Follow-up Information     Follow up With Specialties Details Why Contact Info Additional Information    Blas Camacho,  Family Medicine Call in 1 day follow up being seen in the emergency department [address]  [facility] [address] [facility] Emergency Department Emergency Medicine Go to  As needed, If symptoms worsen [facility] [id number]  [address] [place] [address] Emergency Department[address], [address]          There are no discharge medications for this patient  No discharge procedures on file      PDMP Review     None          ED Provider  Electronically Signed by           Corey Beasley MD  04/05/21 8552

## 2021-04-06 LAB — BACTERIA UR CULT: NORMAL

## 2021-04-12 ENCOUNTER — TRANSCRIBE ORDERS (OUTPATIENT)
Dept: LAB | Facility: MEDICAL CENTER | Age: 26
End: 2021-04-12

## 2021-04-12 ENCOUNTER — APPOINTMENT (OUTPATIENT)
Dept: LAB | Facility: MEDICAL CENTER | Age: 26
End: 2021-04-12
Payer: COMMERCIAL

## 2021-04-12 DIAGNOSIS — Z11.3 SCREENING FOR STDS (SEXUALLY TRANSMITTED DISEASES): ICD-10-CM

## 2021-04-12 DIAGNOSIS — N91.2 AMENORRHEA: ICD-10-CM

## 2021-04-12 DIAGNOSIS — N91.2 AMENORRHEA: Primary | ICD-10-CM

## 2021-04-12 LAB
ABO GROUP BLD: NORMAL
BACTERIA UR QL AUTO: ABNORMAL /HPF
BASOPHILS # BLD AUTO: 0.04 THOUSANDS/ΜL (ref 0–0.1)
BASOPHILS NFR BLD AUTO: 1 % (ref 0–1)
BILIRUB UR QL STRIP: NEGATIVE
BLD GP AB SCN SERPL QL: NEGATIVE
CLARITY UR: ABNORMAL
COLOR UR: YELLOW
EOSINOPHIL # BLD AUTO: 0.06 THOUSAND/ΜL (ref 0–0.61)
EOSINOPHIL NFR BLD AUTO: 1 % (ref 0–6)
ERYTHROCYTE [DISTWIDTH] IN BLOOD BY AUTOMATED COUNT: 14.6 % (ref 11.6–15.1)
GLUCOSE UR STRIP-MCNC: NEGATIVE MG/DL
HBV SURFACE AB SER-ACNC: 58.38 MIU/ML
HBV SURFACE AG SER QL: NORMAL
HCT VFR BLD AUTO: 40.4 % (ref 34.8–46.1)
HCV AB SER QL: NORMAL
HGB BLD-MCNC: 13.1 G/DL (ref 11.5–15.4)
HGB UR QL STRIP.AUTO: NEGATIVE
IMM GRANULOCYTES # BLD AUTO: 0.04 THOUSAND/UL (ref 0–0.2)
IMM GRANULOCYTES NFR BLD AUTO: 1 % (ref 0–2)
KETONES UR STRIP-MCNC: NEGATIVE MG/DL
LEUKOCYTE ESTERASE UR QL STRIP: NEGATIVE
LYMPHOCYTES # BLD AUTO: 1.33 THOUSANDS/ΜL (ref 0.6–4.47)
LYMPHOCYTES NFR BLD AUTO: 15 % (ref 14–44)
MCH RBC QN AUTO: 29.2 PG (ref 26.8–34.3)
MCHC RBC AUTO-ENTMCNC: 32.4 G/DL (ref 31.4–37.4)
MCV RBC AUTO: 90 FL (ref 82–98)
MONOCYTES # BLD AUTO: 0.65 THOUSAND/ΜL (ref 0.17–1.22)
MONOCYTES NFR BLD AUTO: 8 % (ref 4–12)
MUCOUS THREADS UR QL AUTO: ABNORMAL
NEUTROPHILS # BLD AUTO: 6.51 THOUSANDS/ΜL (ref 1.85–7.62)
NEUTS SEG NFR BLD AUTO: 74 % (ref 43–75)
NITRITE UR QL STRIP: NEGATIVE
NON-SQ EPI CELLS URNS QL MICRO: ABNORMAL /HPF
NRBC BLD AUTO-RTO: 0 /100 WBCS
PH UR STRIP.AUTO: 8.5 [PH]
PLATELET # BLD AUTO: 313 THOUSANDS/UL (ref 149–390)
PMV BLD AUTO: 10 FL (ref 8.9–12.7)
PROT UR STRIP-MCNC: ABNORMAL MG/DL
RBC # BLD AUTO: 4.48 MILLION/UL (ref 3.81–5.12)
RBC #/AREA URNS AUTO: ABNORMAL /HPF
RH BLD: POSITIVE
RPR SER QL: NORMAL
RUBV IGG SERPL IA-ACNC: 110.1 IU/ML
SP GR UR STRIP.AUTO: 1.03 (ref 1–1.03)
SPECIMEN EXPIRATION DATE: NORMAL
UROBILINOGEN UR QL STRIP.AUTO: 0.2 E.U./DL
WBC # BLD AUTO: 8.63 THOUSAND/UL (ref 4.31–10.16)
WBC #/AREA URNS AUTO: ABNORMAL /HPF

## 2021-04-12 PROCEDURE — 86803 HEPATITIS C AB TEST: CPT

## 2021-04-12 PROCEDURE — 81001 URINALYSIS AUTO W/SCOPE: CPT

## 2021-04-12 PROCEDURE — 87086 URINE CULTURE/COLONY COUNT: CPT

## 2021-04-12 PROCEDURE — 80081 OBSTETRIC PANEL INC HIV TSTG: CPT

## 2021-04-12 PROCEDURE — 36415 COLL VENOUS BLD VENIPUNCTURE: CPT

## 2021-04-12 PROCEDURE — 86706 HEP B SURFACE ANTIBODY: CPT

## 2021-04-13 LAB
BACTERIA UR CULT: NORMAL
HIV 1+2 AB+HIV1 P24 AG SERPL QL IA: NORMAL

## 2021-11-16 ENCOUNTER — AMB VIDEO VISIT (OUTPATIENT)
Dept: OTHER | Facility: HOSPITAL | Age: 26
End: 2021-11-16

## 2021-11-16 DIAGNOSIS — H92.12 OTORRHEA OF LEFT EAR: Primary | ICD-10-CM

## 2021-11-16 PROCEDURE — ECARE PR SL URGENT CARE VIRTUAL VISIT: Performed by: PHYSICIAN ASSISTANT

## 2021-11-16 RX ORDER — CEFDINIR 300 MG/1
300 CAPSULE ORAL EVERY 12 HOURS SCHEDULED
Qty: 20 CAPSULE | Refills: 0 | Status: SHIPPED | OUTPATIENT
Start: 2021-11-16 | End: 2021-11-26

## 2021-11-16 RX ORDER — OFLOXACIN 3 MG/ML
5 SOLUTION AURICULAR (OTIC) DAILY
Qty: 5 ML | Refills: 0 | Status: SHIPPED | OUTPATIENT
Start: 2021-11-16 | End: 2021-11-23

## 2022-03-07 ENCOUNTER — OFFICE VISIT (OUTPATIENT)
Dept: FAMILY MEDICINE CLINIC | Facility: CLINIC | Age: 27
End: 2022-03-07
Payer: COMMERCIAL

## 2022-03-07 VITALS
SYSTOLIC BLOOD PRESSURE: 126 MMHG | BODY MASS INDEX: 37.22 KG/M2 | DIASTOLIC BLOOD PRESSURE: 76 MMHG | TEMPERATURE: 97.3 F | HEIGHT: 64 IN | WEIGHT: 218 LBS

## 2022-03-07 DIAGNOSIS — H60.312 CHRONIC DIFFUSE OTITIS EXTERNA OF LEFT EAR: ICD-10-CM

## 2022-03-07 DIAGNOSIS — L20.84 INTRINSIC ECZEMA: Primary | ICD-10-CM

## 2022-03-07 PROCEDURE — 1036F TOBACCO NON-USER: CPT | Performed by: FAMILY MEDICINE

## 2022-03-07 PROCEDURE — 3008F BODY MASS INDEX DOCD: CPT | Performed by: FAMILY MEDICINE

## 2022-03-07 PROCEDURE — 3725F SCREEN DEPRESSION PERFORMED: CPT | Performed by: FAMILY MEDICINE

## 2022-03-07 PROCEDURE — 99214 OFFICE O/P EST MOD 30 MIN: CPT | Performed by: FAMILY MEDICINE

## 2022-03-07 RX ORDER — OFLOXACIN 3 MG/ML
SOLUTION/ DROPS OPHTHALMIC
Qty: 5 ML | Refills: 0 | Status: SHIPPED | OUTPATIENT
Start: 2022-03-07 | End: 2022-05-06 | Stop reason: ALTCHOICE

## 2022-03-07 RX ORDER — AZITHROMYCIN 250 MG/1
TABLET, FILM COATED ORAL
Qty: 6 TABLET | Refills: 0 | Status: SHIPPED | OUTPATIENT
Start: 2022-03-07 | End: 2022-03-11

## 2022-03-07 RX ORDER — CIMETIDINE 300 MG/1
300 TABLET, FILM COATED ORAL 3 TIMES DAILY
Qty: 30 TABLET | Refills: 3 | Status: SHIPPED | OUTPATIENT
Start: 2022-03-07

## 2022-03-07 NOTE — PROGRESS NOTES
Depression Screening and Follow-up Plan: Patient was screened for depression during today's encounter  They screened negative with a PHQ-2 score of 0  Assessment/Plan:    Problem List Items Addressed This Visit     None      Visit Diagnoses     Intrinsic eczema    -  Primary           Diagnoses and all orders for this visit:    Intrinsic eczema        No problem-specific Assessment & Plan notes found for this encounter  Subjective:      Patient ID: Shad Elizabeth is a 27 y o  female  Patient is experiencing symptoms of eczema it is mostly on the arms and legs but to a lesser extent on the trunk we reviewed all of the things that can trigger eczema the seasonality of eczema and made specific recommendations      The following portions of the patient's history were reviewed and updated as appropriate:   She has a past medical history of Ear disease, Hearing loss, and Insomnia ,  does not have any pertinent problems on file  ,   has a past surgical history that includes Tympanostomy tube placement (Right); TONSILECTOMY AND ADNOIDECTOMY; and EAR SURGERY (Left)  ,  family history includes Allergies in her mother; Diabetes type II in her paternal grandfather  ,   reports that she quit smoking about 9 years ago  She quit after 3 00 years of use  She has never used smokeless tobacco  She reports previous alcohol use  She reports that she does not use drugs  ,  is allergic to augmentin [amoxicillin-pot clavulanate], bactrim [sulfamethoxazole-trimethoprim], and coconut oil - food allergy     No current outpatient medications on file  No current facility-administered medications for this visit  Review of Systems   Constitutional: Negative for activity change, appetite change, diaphoresis, fatigue and fever  HENT: Negative  Eyes: Negative  Respiratory: Negative for apnea, cough, chest tightness, shortness of breath and wheezing      Cardiovascular: Negative for chest pain, palpitations and leg swelling  Gastrointestinal: Negative for abdominal distention, abdominal pain, anal bleeding, constipation, diarrhea, nausea and vomiting  Endocrine: Negative for cold intolerance, heat intolerance, polydipsia, polyphagia and polyuria  Genitourinary: Negative for difficulty urinating, dysuria, flank pain, hematuria and urgency  Musculoskeletal: Negative for arthralgias, back pain, gait problem, joint swelling and myalgias  Skin: Positive for rash  Negative for color change and wound  Allergic/Immunologic: Negative for environmental allergies, food allergies and immunocompromised state  Neurological: Negative for dizziness, seizures, syncope, speech difficulty, numbness and headaches  Hematological: Negative for adenopathy  Does not bruise/bleed easily  Psychiatric/Behavioral: Negative for agitation, behavioral problems, hallucinations, sleep disturbance and suicidal ideas  Objective:  Vitals:    03/07/22 0901   BP: 126/76   BP Location: Left arm   Patient Position: Sitting   Cuff Size: Standard   Temp: (!) 97 3 °F (36 3 °C)   TempSrc: Temporal   Weight: 98 9 kg (218 lb)   Height: 5' 4" (1 626 m)     Body mass index is 37 42 kg/m²  Physical Exam  Constitutional:       General: She is not in acute distress  Appearance: She is well-developed  She is not diaphoretic  HENT:      Head: Normocephalic  Right Ear: External ear normal       Left Ear: External ear normal       Nose: Nose normal    Eyes:      General: No scleral icterus  Right eye: No discharge  Left eye: No discharge  Conjunctiva/sclera: Conjunctivae normal       Pupils: Pupils are equal, round, and reactive to light  Neck:      Thyroid: No thyromegaly  Trachea: No tracheal deviation  Cardiovascular:      Rate and Rhythm: Normal rate and regular rhythm  Heart sounds: Normal heart sounds  No murmur heard  No friction rub  No gallop      Pulmonary:      Effort: Pulmonary effort is normal  No respiratory distress  Breath sounds: Normal breath sounds  No wheezing  Abdominal:      General: Bowel sounds are normal       Palpations: Abdomen is soft  There is no mass  Tenderness: There is no abdominal tenderness  There is no guarding  Musculoskeletal:         General: No deformity  Cervical back: Normal range of motion  Lymphadenopathy:      Cervical: No cervical adenopathy  Skin:     General: Skin is warm and dry  Findings: Rash present  No erythema  Neurological:      Mental Status: She is alert and oriented to person, place, and time  Cranial Nerves: No cranial nerve deficit  Psychiatric:         Thought Content:  Thought content normal

## 2022-05-06 ENCOUNTER — OFFICE VISIT (OUTPATIENT)
Dept: FAMILY MEDICINE CLINIC | Facility: CLINIC | Age: 27
End: 2022-05-06
Payer: COMMERCIAL

## 2022-05-06 VITALS
HEIGHT: 64 IN | TEMPERATURE: 32.5 F | OXYGEN SATURATION: 96 % | WEIGHT: 221.8 LBS | BODY MASS INDEX: 37.87 KG/M2 | SYSTOLIC BLOOD PRESSURE: 126 MMHG | HEART RATE: 89 BPM | DIASTOLIC BLOOD PRESSURE: 76 MMHG

## 2022-05-06 DIAGNOSIS — H60.312 CHRONIC DIFFUSE OTITIS EXTERNA OF LEFT EAR: Primary | ICD-10-CM

## 2022-05-06 DIAGNOSIS — Z86.69 HISTORY OF CHOLESTEATOMA: ICD-10-CM

## 2022-05-06 PROCEDURE — 99214 OFFICE O/P EST MOD 30 MIN: CPT | Performed by: PHYSICIAN ASSISTANT

## 2022-05-06 PROCEDURE — 1036F TOBACCO NON-USER: CPT | Performed by: PHYSICIAN ASSISTANT

## 2022-05-06 PROCEDURE — 3008F BODY MASS INDEX DOCD: CPT | Performed by: PHYSICIAN ASSISTANT

## 2022-05-06 RX ORDER — CIPROFLOXACIN AND DEXAMETHASONE 3; 1 MG/ML; MG/ML
4 SUSPENSION/ DROPS AURICULAR (OTIC) 2 TIMES DAILY
Qty: 7.5 ML | Refills: 0 | Status: SHIPPED | OUTPATIENT
Start: 2022-05-06 | End: 2022-05-11 | Stop reason: CLARIF

## 2022-05-06 NOTE — PROGRESS NOTES
Assessment/Plan:    Problem List Items Addressed This Visit     None      Visit Diagnoses     Chronic diffuse otitis externa of left ear    -  Primary    Relevant Medications    ciprofloxacin-dexamethasone (CIPRODEX) otic suspension    Other Relevant Orders    Ambulatory Referral to Otolaryngology    History of cholesteatoma        Relevant Orders    Ambulatory Referral to Otolaryngology           Diagnoses and all orders for this visit:    Chronic diffuse otitis externa of left ear  -     Ambulatory Referral to Otolaryngology; Future  -     ciprofloxacin-dexamethasone (CIPRODEX) otic suspension; Administer 4 drops into the left ear 2 (two) times a day    History of cholesteatoma  -     Ambulatory Referral to Otolaryngology; Future        Script for ciprodex   Recommended that she follow up with ENT  Referral placed  Subjective:      Patient ID: Arlet Dickerson is a 27 y o  female  Flynn Kathleen is a 27year old female who is here today complaining of drainage from her left ear  She started with bloody drainage about 3 days ago  She has a history of a cholesteatoma and choric otitis externa  She was seeing Dr Nolvia Baires, but he retired  She has not found a new ENT  She denies any headaches, fevers, or chills  She did not try anything for her symptoms  The following portions of the patient's history were reviewed and updated as appropriate:   She has a past medical history of Ear disease, Hearing loss, and Insomnia ,  does not have any pertinent problems on file  ,   has a past surgical history that includes Tympanostomy tube placement (Right); TONSILECTOMY AND ADNOIDECTOMY; and EAR SURGERY (Left)  ,  family history includes Allergies in her mother; Diabetes type II in her paternal grandfather  ,   reports that she quit smoking about 9 years ago  She quit after 3 00 years of use  She has never used smokeless tobacco  She reports previous alcohol use  She reports that she does not use drugs  ,  is allergic to augmentin [amoxicillin-pot clavulanate], bactrim [sulfamethoxazole-trimethoprim], and coconut oil - food allergy     Current Outpatient Medications   Medication Sig Dispense Refill    cimetidine (TAGAMET) 300 MG tablet Take 1 tablet (300 mg total) by mouth 3 (three) times a day (Patient not taking: Reported on 5/6/2022 ) 30 tablet 3    ciprofloxacin-dexamethasone (CIPRODEX) otic suspension Administer 4 drops into the left ear 2 (two) times a day 7 5 mL 0    triamcinolone (KENALOG) 0 1 % ointment Apply topically 2 (two) times a day (Patient not taking: Reported on 5/6/2022 ) 80 g 1     No current facility-administered medications for this visit  Review of Systems   Constitutional: Negative for chills, diaphoresis, fatigue and fever  HENT: Positive for ear discharge (left)  Negative for congestion, ear pain, postnasal drip, rhinorrhea, sneezing, sore throat and trouble swallowing  Eyes: Negative for pain and visual disturbance  Respiratory: Negative for apnea, cough, shortness of breath and wheezing  Cardiovascular: Negative for chest pain and palpitations  Gastrointestinal: Negative for abdominal pain, constipation, diarrhea, nausea and vomiting  Genitourinary: Negative for dysuria and hematuria  Musculoskeletal: Negative for arthralgias, gait problem and myalgias  Neurological: Negative for dizziness, syncope, weakness, light-headedness, numbness and headaches  Psychiatric/Behavioral: Negative for suicidal ideas  The patient is not nervous/anxious  Objective:  Vitals:    05/06/22 1321   BP: 126/76   Pulse: 89   Temp: (!) 32 5 °F (0 3 °C)   SpO2: 96%   Weight: 101 kg (221 lb 12 8 oz)   Height: 5' 4" (1 626 m)     Body mass index is 38 07 kg/m²  Physical Exam  Vitals and nursing note reviewed  Constitutional:       Appearance: She is well-developed  HENT:      Head: Normocephalic and atraumatic        Right Ear: Tympanic membrane, ear canal and external ear normal  A PE tube is present  Left Ear: Ear canal and external ear normal  Drainage present  Nose: Nose normal       Mouth/Throat:      Pharynx: No oropharyngeal exudate or posterior oropharyngeal erythema  Eyes:      Extraocular Movements: Extraocular movements intact  Cardiovascular:      Rate and Rhythm: Normal rate and regular rhythm  Heart sounds: Normal heart sounds  No murmur heard  No friction rub  No gallop  Pulmonary:      Effort: Pulmonary effort is normal  No respiratory distress  Breath sounds: Normal breath sounds  No wheezing or rales  Musculoskeletal:         General: Normal range of motion  Cervical back: Normal range of motion and neck supple  Lymphadenopathy:      Cervical: No cervical adenopathy  Skin:     General: Skin is warm and dry  Neurological:      Mental Status: She is alert and oriented to person, place, and time  Psychiatric:         Behavior: Behavior normal          Thought Content:  Thought content normal          Judgment: Judgment normal

## 2022-05-11 ENCOUNTER — TELEPHONE (OUTPATIENT)
Dept: FAMILY MEDICINE CLINIC | Facility: CLINIC | Age: 27
End: 2022-05-11

## 2022-05-11 DIAGNOSIS — H60.312 CHRONIC DIFFUSE OTITIS EXTERNA OF LEFT EAR: ICD-10-CM

## 2022-07-29 ENCOUNTER — OFFICE VISIT (OUTPATIENT)
Dept: AUDIOLOGY | Facility: CLINIC | Age: 27
End: 2022-07-29
Payer: COMMERCIAL

## 2022-07-29 ENCOUNTER — OFFICE VISIT (OUTPATIENT)
Dept: OTOLARYNGOLOGY | Facility: CLINIC | Age: 27
End: 2022-07-29
Payer: COMMERCIAL

## 2022-07-29 VITALS
HEIGHT: 64 IN | WEIGHT: 225 LBS | DIASTOLIC BLOOD PRESSURE: 70 MMHG | BODY MASS INDEX: 38.41 KG/M2 | HEART RATE: 70 BPM | OXYGEN SATURATION: 92 % | TEMPERATURE: 97.3 F | SYSTOLIC BLOOD PRESSURE: 110 MMHG

## 2022-07-29 DIAGNOSIS — Z86.69 HISTORY OF CHOLESTEATOMA: ICD-10-CM

## 2022-07-29 DIAGNOSIS — H71.92 CHOLESTEATOMA OF LEFT EAR: Primary | ICD-10-CM

## 2022-07-29 DIAGNOSIS — H60.312 CHRONIC DIFFUSE OTITIS EXTERNA OF LEFT EAR: ICD-10-CM

## 2022-07-29 DIAGNOSIS — H90.6 MIXED CONDUCTIVE AND SENSORINEURAL HEARING LOSS OF BOTH EARS: ICD-10-CM

## 2022-07-29 DIAGNOSIS — Z90.89 H/O MASTOIDECTOMY: ICD-10-CM

## 2022-07-29 DIAGNOSIS — H90.6 MIXED HEARING LOSS, BILATERAL: Primary | ICD-10-CM

## 2022-07-29 PROCEDURE — 99204 OFFICE O/P NEW MOD 45 MIN: CPT | Performed by: OTOLARYNGOLOGY

## 2022-07-29 PROCEDURE — 92567 TYMPANOMETRY: CPT | Performed by: AUDIOLOGIST-HEARING AID FITTER

## 2022-07-29 PROCEDURE — 92557 COMPREHENSIVE HEARING TEST: CPT | Performed by: AUDIOLOGIST-HEARING AID FITTER

## 2022-07-29 RX ORDER — CIPROFLOXACIN AND DEXAMETHASONE 3; 1 MG/ML; MG/ML
4 SUSPENSION/ DROPS AURICULAR (OTIC) 2 TIMES DAILY
Qty: 7.5 ML | Refills: 2 | Status: SHIPPED | OUTPATIENT
Start: 2022-07-29 | End: 2022-08-02

## 2022-07-29 NOTE — PROGRESS NOTES
Review of Systems   Constitutional: Negative  HENT: Positive for ear discharge, hearing loss and tinnitus  Eyes: Negative  Respiratory: Negative  Cardiovascular: Negative  Gastrointestinal: Negative  Endocrine: Negative  Genitourinary: Negative  Musculoskeletal: Negative  Skin: Negative  Allergic/Immunologic: Negative  Neurological: Positive for dizziness  Hematological: Negative  Psychiatric/Behavioral: Negative

## 2022-07-29 NOTE — PROGRESS NOTES
Otolaryngology Clinic Visit  Name:  Lidia Betancourt  MRN:  [id number]  Date:  2022 11:10 AM  ________________________________________________________________________       CHIEF COMPLAINT:   Ear issues     HPI:  Lidia Betancourt is a 24 y o  female with PMH significant for left ear radical mastoidectomy when she was 12 and current right ear tube s/p 6 years ago who presents with concerns of ear issues  She states her left ear has been draining for about 1 5 years  She has tried multiple drops and oral antibiotics which seem to help at the time but the draining returns  Hearing is poor on that side has previous imaging as below  Audiogram as below       PMHx:  Past Medical History:   Diagnosis Date    Ear disease     chronic cyst     Hearing loss     Insomnia     Last assessed 7/15/2014        PSHx:  Past Surgical History:   Procedure Laterality Date    EAR SURGERY Left     Foreign body removed     TONSILECTOMY AND ADNOIDECTOMY      TYMPANOSTOMY TUBE PLACEMENT Right        FAMHx:  Family History   Problem Relation Age of Onset    Allergies Mother     Diabetes type II Paternal Grandfather        SOCHx:  Social History     Socioeconomic History    Marital status: /Civil Union     Spouse name: None    Number of children: None    Years of education: None    Highest education level: None   Occupational History    None   Tobacco Use    Smoking status: Former Smoker     Years: 3 00     Quit date:      Years since quittin 5    Smokeless tobacco: Never Used   Vaping Use    Vaping Use: Never used   Substance and Sexual Activity    Alcohol use: Not Currently    Drug use: No    Sexual activity: None   Other Topics Concern    None   Social History Narrative    Living with single parent    No living will      Social Determinants of Health     Financial Resource Strain: Not on file   Food Insecurity: Not on file   Transportation Needs: Not on file   Physical Activity: Not on file   Stress: Not on file   Social Connections: Not on file   Intimate Partner Violence: Not on file   Housing Stability: Not on file       Allergies: Allergies   Allergen Reactions    Augmentin [Amoxicillin-Pot Clavulanate] Hives    Bactrim [Sulfamethoxazole-Trimethoprim] Hives    Coconut Oil - Food Allergy Hives        MEDS:  Reviewed    ROS:  See MA note     PHYSICAL EXAM:  /70 (BP Location: Left arm, Cuff Size: Large)   Pulse 70   Temp (!) 97 3 °F (36 3 °C) (Temporal)   Ht 5' 4" (1 626 m)   Wt 102 kg (225 lb)   SpO2 92%   BMI 38 62 kg/m²   General: NAD, AOx4  Eyes:  EOMI  Ears:  Right: ear canal normal, TM with ventilation tube and surrounding perforation  Left: ear canal with purulent fluid and green crusting which was debrided, TM with large cholesteatoma behind Marlo TM with scutal erosion and large polyp filling the middle ear space  Nose:  No septal deviation, no inferior turbinate hypertrophy, no mass/lesions  Oral cavity:  No trismus, no mass/lesions  Neck: Trachea is midline; no thyroid nodules, Salivary glands symmetrical, no masses/abnormality on palpation  Lymph:  No cervical lymphadenopathy  Skin:  No obvious facial lesions  Neuro: Face symmetrical, no obvious cranial nerve palsy  No focal deficits   Lungs:  Normal work of breathing, symmetrical chest expansion  Vascular: Well perfused    Procedures:  None     Medical Data Reviewed:  Records reviewed and summarized as in EPIC    Radiology:  CT head 2016 - no left ossicular chain visualized, opacification, there is a canal wall up mastoidectomy    Labs:   Audiogram from Today SNHL on the Right, with a moderate to severe mixed loss on the Left  Tympanogram from Today Type B  Reviewed personally with patient and audiology      Patient Active Problem List   Diagnosis    Acid reflux    Trigeminal neuralgia    Transient neurological symptoms, recurrent    Hearing loss    Dizziness       ASSESSMENT/PLAN:  Zaid Bauer is a 24 y o  female with acute and chronic problems as above who presents with:    1  Cholesteatoma of left ear    2  Chronic diffuse otitis externa of left ear    3  History of cholesteatoma    4  Mixed conductive and sensorineural hearing loss of both ears    5  H/O mastoidectomy        24 y o  here today for further evaluation of chronic ear issues with left ear otorrhea for about 1 5 years  Prescribing Ciprofloxacin-Dexamethasone drops for left cholesteatoma  Requesting CT temporal bone scan to further evaluate extent of cholesteatoma  Audiogram reviewed with patient today  RTC after obtaining CT scan to further direct surgical planning either with me or with Dr Vanessa Elizabeth

## 2022-07-29 NOTE — PROGRESS NOTES
ENT HEARING EVALUATION    Name:  Zachary Gee  :  1995  Age:  24 y o  Date of Evaluation: 22     History: ENT Audiogram  Reason for visit: Zachary Gee is being seen today at the request of Dr Alfreda Hicks for an evaluation of hearing as part of their initial ENT visit  EVALUATION:      Tympanometry:   Right: Type B - middle ear disorder   Left: Type B - middle ear disorder    Audiogram Results:  Pure tone testing revealed a moderate rising to normal mixed hearing loss in the  right ear and a moderately severe rising to moderate mixed hearing loss in the  left  ear  SRT and PTA are in agreement indicating good test reliability  Word recognition scores were excellent bilaterally         *see attached audiogram      RECOMMENDATIONS:  Consult ENT and Return to [facility]  for F/U      Philly Weinstein   Clinical Audiologist

## 2022-08-02 RX ORDER — CIPROFLOXACIN AND DEXAMETHASONE 3; 1 MG/ML; MG/ML
4 SUSPENSION/ DROPS AURICULAR (OTIC) 2 TIMES DAILY
Qty: 7.5 ML | Refills: 2 | Status: SHIPPED | OUTPATIENT
Start: 2022-08-02 | End: 2022-09-13 | Stop reason: ALTCHOICE

## 2022-08-05 ENCOUNTER — HOSPITAL ENCOUNTER (OUTPATIENT)
Dept: CT IMAGING | Facility: HOSPITAL | Age: 27
Discharge: HOME | End: 2022-08-05
Payer: COMMERCIAL

## 2022-08-05 ENCOUNTER — TELEPHONE (OUTPATIENT)
Dept: OTHER | Facility: OTHER | Age: 27
End: 2022-08-05

## 2022-08-05 DIAGNOSIS — Z86.69 HISTORY OF CHOLESTEATOMA: ICD-10-CM

## 2022-08-05 DIAGNOSIS — H60.312 CHRONIC DIFFUSE OTITIS EXTERNA OF LEFT EAR: ICD-10-CM

## 2022-08-05 DIAGNOSIS — H71.92 CHOLESTEATOMA OF LEFT EAR: ICD-10-CM

## 2022-08-05 PROCEDURE — G1004 CDSM NDSC: HCPCS

## 2022-08-05 PROCEDURE — 70480 CT ORBIT/EAR/FOSSA W/O DYE: CPT

## 2022-08-05 NOTE — TELEPHONE ENCOUNTER
Patient is calling in stating that she had ciprodex ear drops ordered last Friday 7/29 and still has not been able to receive them because the pharmacy states they did not receive the prior auth  Please follow up with patient to let her know if the prior auth is in process and when the medication is expected to be ready

## 2022-08-16 ENCOUNTER — TELEPHONE (OUTPATIENT)
Dept: OTOLARYNGOLOGY | Facility: CLINIC | Age: 27
End: 2022-08-16

## 2022-08-16 ENCOUNTER — OFFICE VISIT (OUTPATIENT)
Dept: OTOLARYNGOLOGY | Facility: CLINIC | Age: 27
End: 2022-08-16
Payer: COMMERCIAL

## 2022-08-16 VITALS
OXYGEN SATURATION: 98 % | WEIGHT: 224 LBS | DIASTOLIC BLOOD PRESSURE: 84 MMHG | SYSTOLIC BLOOD PRESSURE: 124 MMHG | HEART RATE: 102 BPM | BODY MASS INDEX: 38.24 KG/M2 | HEIGHT: 64 IN | TEMPERATURE: 98 F

## 2022-08-16 DIAGNOSIS — H71.92 CHOLESTEATOMA OF LEFT EAR: ICD-10-CM

## 2022-08-16 DIAGNOSIS — H60.312 CHRONIC DIFFUSE OTITIS EXTERNA OF LEFT EAR: Primary | ICD-10-CM

## 2022-08-16 DIAGNOSIS — Z86.69 HISTORY OF CHOLESTEATOMA: ICD-10-CM

## 2022-08-16 DIAGNOSIS — Z90.89 H/O MASTOIDECTOMY: ICD-10-CM

## 2022-08-16 DIAGNOSIS — H90.6 MIXED CONDUCTIVE AND SENSORINEURAL HEARING LOSS OF BOTH EARS: ICD-10-CM

## 2022-08-16 PROCEDURE — 99213 OFFICE O/P EST LOW 20 MIN: CPT | Performed by: OTOLARYNGOLOGY

## 2022-08-16 NOTE — PROGRESS NOTES
Otolaryngology Clinic Visit  Name:  Amparo Richards  MRN:  [id number]  Date:  2022 2:15 PM  ________________________________________________________________________       CHIEF COMPLAINT:   Ear issues     HPI:  Amparo Richards is a 24 y o  female with PMH significant for left ear radical mastoidectomy when she was 12 and current right ear tube s/p 6 years ago who presents with concerns of ear issues  She states her left ear has been draining for about 1 5 years  She has tried multiple drops and oral antibiotics which seem to help at the time but the draining returns  Hearing is poor on that side has previous imaging as below  Audiogram as below  Interval HPI:  Patient has had worsening vertigo  Occurs intermittently 4-5 minutes at a time  Patient just had her cipro dex drops approved by insurance      PMHx:  Past Medical History:   Diagnosis Date    Ear disease     chronic cyst     Hearing loss     Insomnia     Last assessed 7/15/2014        PSHx:  Past Surgical History:   Procedure Laterality Date    EAR SURGERY Left     Foreign body removed     TONSILECTOMY AND ADNOIDECTOMY      TYMPANOSTOMY TUBE PLACEMENT Right        FAMHx:  Family History   Problem Relation Age of Onset    Allergies Mother     Diabetes type II Paternal Grandfather        SOCHx:  Social History     Socioeconomic History    Marital status: /Civil Union     Spouse name: None    Number of children: None    Years of education: None    Highest education level: None   Occupational History    None   Tobacco Use    Smoking status: Former Smoker     Years: 3 00     Quit date:      Years since quittin 6    Smokeless tobacco: Never Used   Vaping Use    Vaping Use: Never used   Substance and Sexual Activity    Alcohol use: Not Currently    Drug use: No    Sexual activity: None   Other Topics Concern    None   Social History Narrative    Living with single parent    No living will      Social Determinants of Health Financial Resource Strain: Not on file   Food Insecurity: Not on file   Transportation Needs: Not on file   Physical Activity: Not on file   Stress: Not on file   Social Connections: Not on file   Intimate Partner Violence: Not on file   Housing Stability: Not on file       Allergies: Allergies   Allergen Reactions    Augmentin [Amoxicillin-Pot Clavulanate] Hives    Bactrim [Sulfamethoxazole-Trimethoprim] Hives    Coconut Oil - Food Allergy Hives        MEDS:  Reviewed    ROS:  See MA note     PHYSICAL EXAM:  /84 (BP Location: Right arm, Cuff Size: Standard)   Pulse 102   Temp 98 °F (36 7 °C)   Ht 5' 4" (1 626 m)   Wt 102 kg (224 lb)   SpO2 98%   BMI 38 45 kg/m²   General: NAD, AOx4  Eyes:  EOMI  Ears:  Right: ear canal normal, TM with ventilation tube and surrounding perforation  Left: ear canal with purulent fluid and green crusting which was debrided, TM with large cholesteatoma behind Marlo TM with scutal erosion and large polyp filling the middle ear space  Nose:  No septal deviation, no inferior turbinate hypertrophy, no mass/lesions  Oral cavity:  No trismus, no mass/lesions  Neck: Trachea is midline; no thyroid nodules, Salivary glands symmetrical, no masses/abnormality on palpation  Lymph:  No cervical lymphadenopathy  Skin:  No obvious facial lesions  Neuro: Face symmetrical, no obvious cranial nerve palsy  No focal deficits   Lungs:  Normal work of breathing, symmetrical chest expansion  Vascular: Well perfused    Procedures:  None     Medical Data Reviewed:  Records reviewed and summarized as in EPIC    Radiology:  CT head 2016 - no left ossicular chain visualized, opacification    Labs:   Audiogram from Today SNHL on the Right, with a moderate to severe mixed loss on the Left  Tympanogram from Today Type B  Reviewed personally with patient and audiology      Patient Active Problem List   Diagnosis    Acid reflux    Trigeminal neuralgia    Transient neurological symptoms, recurrent  Hearing loss    Dizziness       ASSESSMENT/PLAN:  Zaid Bauer is a 24 y o  female with acute and chronic problems as above who presents with:    1  Chronic diffuse otitis externa of left ear    2  History of cholesteatoma    3  Cholesteatoma of left ear    4  Mixed conductive and sensorineural hearing loss of both ears    5  H/O mastoidectomy        24 y o  here today for further evaluation of chronic ear issues with left ear otorrhea for about 1 5 years  Insurance just approved Ciprofloxacin-Dexamethasone drops  Instructed to start immediately  Discussed and reviewed her CT scan findings which show either recurrent cholesteatoma versus scar tissue  Given findings on otoscopy, favor recurrent cholesteatoma at this time  Given the extent of the disease and the possibility of middle ear reconstruction, referring patient to Dr Anahi Ruiz for surgical evaluation  RTC in 1 month for continued surveillance

## 2022-08-16 NOTE — PROGRESS NOTES
Review of Systems   Constitutional: Negative  HENT: Negative  Eyes: Negative  Respiratory: Negative  Cardiovascular: Negative  Gastrointestinal: Negative  Endocrine: Negative  Genitourinary: Negative  Musculoskeletal: Positive for neck pain and neck stiffness  Skin: Negative  Allergic/Immunologic: Negative  Neurological: Positive for dizziness and headaches  Hematological: Negative  Psychiatric/Behavioral: Negative

## 2022-08-16 NOTE — TELEPHONE ENCOUNTER
Received a fax from [facility] stating that the ciprofloxacin-dexamethasone 0 3-0 1% suspension has been approved from 8/16/22-9/15/22    I called and spoke with the [facility] DR PEPPER SCHNEIDER in [place] [address] and informed them that an authorization has been obtained and they informed me that the patient has a $15 copay  I called and informed the patient that I let the pharmacy know about her ear drops have been approved for a month

## 2022-09-13 ENCOUNTER — OFFICE VISIT (OUTPATIENT)
Dept: OTOLARYNGOLOGY | Facility: CLINIC | Age: 27
End: 2022-09-13
Payer: COMMERCIAL

## 2022-09-13 VITALS
HEART RATE: 91 BPM | SYSTOLIC BLOOD PRESSURE: 116 MMHG | TEMPERATURE: 97.7 F | OXYGEN SATURATION: 97 % | WEIGHT: 224 LBS | BODY MASS INDEX: 38.24 KG/M2 | DIASTOLIC BLOOD PRESSURE: 80 MMHG | HEIGHT: 64 IN

## 2022-09-13 DIAGNOSIS — H71.92 CHOLESTEATOMA OF LEFT EAR: ICD-10-CM

## 2022-09-13 DIAGNOSIS — Z86.69 HISTORY OF CHOLESTEATOMA: ICD-10-CM

## 2022-09-13 DIAGNOSIS — H60.312 CHRONIC DIFFUSE OTITIS EXTERNA OF LEFT EAR: Primary | ICD-10-CM

## 2022-09-13 DIAGNOSIS — H90.6 MIXED CONDUCTIVE AND SENSORINEURAL HEARING LOSS OF BOTH EARS: ICD-10-CM

## 2022-09-13 PROCEDURE — 99213 OFFICE O/P EST LOW 20 MIN: CPT | Performed by: OTOLARYNGOLOGY

## 2022-09-13 NOTE — PROGRESS NOTES
Otolaryngology Clinic Visit  Name:  Davey Cohn  MRN:  [id number]  Date:  2022 1:43 PM  ________________________________________________________________________       CHIEF COMPLAINT:   Ear issues     HPI:  Davey Cohn is a 24 y o  female with PMH significant for left ear radical mastoidectomy when she was 12 and current right ear tube s/p 6 years ago who presents with concerns of ear issues  She states her left ear has been draining for about 1 5 years  She has tried multiple drops and oral antibiotics which seem to help at the time but the draining returns  Hearing is poor on that side has previous imaging as below  Audiogram as below  Interval HPI:  Patient has had worsening vertigo  Occurs intermittently 4-5 minutes at a time  Patient just had her cipro dex drops approved by insurance      PMHx:  Past Medical History:   Diagnosis Date    Ear disease     chronic cyst     Hearing loss     Insomnia     Last assessed 7/15/2014        PSHx:  Past Surgical History:   Procedure Laterality Date    EAR SURGERY Left     Foreign body removed     TONSILECTOMY AND ADNOIDECTOMY      TYMPANOSTOMY TUBE PLACEMENT Right        FAMHx:  Family History   Problem Relation Age of Onset    Allergies Mother     Diabetes type II Paternal Grandfather        SOCHx:  Social History     Socioeconomic History    Marital status: /Civil Union     Spouse name: None    Number of children: None    Years of education: None    Highest education level: None   Occupational History    None   Tobacco Use    Smoking status: Former Smoker     Years: 3 00     Quit date:      Years since quittin 7    Smokeless tobacco: Never Used   Vaping Use    Vaping Use: Never used   Substance and Sexual Activity    Alcohol use: Not Currently    Drug use: No    Sexual activity: None   Other Topics Concern    None   Social History Narrative    Living with single parent    No living will      Social Determinants of Health Financial Resource Strain: Not on file   Food Insecurity: Not on file   Transportation Needs: Not on file   Physical Activity: Not on file   Stress: Not on file   Social Connections: Not on file   Intimate Partner Violence: Not on file   Housing Stability: Not on file       Allergies: Allergies   Allergen Reactions    Augmentin [Amoxicillin-Pot Clavulanate] Hives    Bactrim [Sulfamethoxazole-Trimethoprim] Hives    Coconut Oil - Food Allergy Hives        MEDS:  Reviewed    ROS:  See MA note     PHYSICAL EXAM:  /80 (BP Location: Left arm, Cuff Size: Large)   Pulse 91   Temp 97 7 °F (36 5 °C) (Temporal)   Ht 5' 4" (1 626 m)   Wt 102 kg (224 lb)   SpO2 97%   BMI 38 45 kg/m²   General: NAD, AOx4  Eyes:  EOMI  Ears:  Right: ear canal normal, TM with ventilation tube and surrounding perforation  Left: ear canal with purulent fluid and green crusting which was debrided, TM with large cholesteatoma behind Marlo TM with scutal erosion and large polyp filling the middle ear space  Nose:  No septal deviation, no inferior turbinate hypertrophy, no mass/lesions  Oral cavity:  No trismus, no mass/lesions  Neck: Trachea is midline; no thyroid nodules, Salivary glands symmetrical, no masses/abnormality on palpation  Lymph:  No cervical lymphadenopathy  Skin:  No obvious facial lesions  Neuro: Face symmetrical, no obvious cranial nerve palsy  No focal deficits   Lungs:  Normal work of breathing, symmetrical chest expansion  Vascular: Well perfused    Procedures:  None     Medical Data Reviewed:  Records reviewed and summarized as in EPIC    Radiology:  CT temporal bone reviewed    Labs:   Audiogram from last visit SNHL on the Right, with a moderate to severe mixed loss on the Left  Tympanogram from visit Type B  Reviewed personally with patient and audiology      Patient Active Problem List   Diagnosis    Acid reflux    Trigeminal neuralgia    Transient neurological symptoms, recurrent    Hearing loss    Dizziness       ASSESSMENT/PLAN:  Cy Mendoza is a 24 y o  female with acute and chronic problems as above who presents with:    1  Chronic diffuse otitis externa of left ear    2  Cholesteatoma of left ear    3  History of cholesteatoma    4  Mixed conductive and sensorineural hearing loss of both ears        24 y o  here today for further evaluation of chronic ear issues with left ear otorrhea for about 1 5 years  Reviewed concerns for recurrent cholesteatoma and presistent ETD  Will have her seen Dr Audrey Palmer for her help on the matter  Her drainage and pain has cooled down   Counseled on using ciprodex with recurrent drainage    -Ciprodex x 2 weeks  -Dry Ear    RTC with any worsening drainage or pain otherwise Dr Audrey Palmer in December

## 2022-09-29 ENCOUNTER — HOSPITAL ENCOUNTER (EMERGENCY)
Facility: HOSPITAL | Age: 27
Discharge: HOME | End: 2022-09-29
Attending: EMERGENCY MEDICINE
Payer: COMMERCIAL

## 2022-09-29 VITALS
TEMPERATURE: 98 F | RESPIRATION RATE: 20 BRPM | HEART RATE: 92 BPM | WEIGHT: 224 LBS | HEIGHT: 64 IN | SYSTOLIC BLOOD PRESSURE: 146 MMHG | DIASTOLIC BLOOD PRESSURE: 86 MMHG | BODY MASS INDEX: 38.24 KG/M2 | OXYGEN SATURATION: 99 %

## 2022-09-29 DIAGNOSIS — B34.9 VIRAL SYNDROME: Primary | ICD-10-CM

## 2022-09-29 DIAGNOSIS — R51.9 HEADACHE: ICD-10-CM

## 2022-09-29 DIAGNOSIS — J02.9 PHARYNGITIS: ICD-10-CM

## 2022-09-29 LAB
EXT PREG TEST URINE: NEGATIVE
EXT. CONTROL ED NAV: NORMAL
S PYO DNA THROAT QL NAA+PROBE: NOT DETECTED

## 2022-09-29 PROCEDURE — 87651 STREP A DNA AMP PROBE: CPT | Performed by: PHYSICIAN ASSISTANT

## 2022-09-29 PROCEDURE — 81025 URINE PREGNANCY TEST: CPT | Performed by: PHYSICIAN ASSISTANT

## 2022-09-29 PROCEDURE — 99283 EMERGENCY DEPT VISIT LOW MDM: CPT

## 2022-09-29 PROCEDURE — 99284 EMERGENCY DEPT VISIT MOD MDM: CPT | Performed by: PHYSICIAN ASSISTANT

## 2022-09-29 RX ORDER — ACETAMINOPHEN 325 MG/1
975 TABLET ORAL ONCE
Status: COMPLETED | OUTPATIENT
Start: 2022-09-29 | End: 2022-09-29

## 2022-09-29 RX ORDER — KETOROLAC TROMETHAMINE 30 MG/ML
15 INJECTION, SOLUTION INTRAMUSCULAR; INTRAVENOUS ONCE
Status: DISCONTINUED | OUTPATIENT
Start: 2022-09-29 | End: 2022-09-29

## 2022-09-29 RX ORDER — IBUPROFEN 600 MG/1
600 TABLET ORAL ONCE
Status: COMPLETED | OUTPATIENT
Start: 2022-09-29 | End: 2022-09-29

## 2022-09-29 RX ORDER — LIDOCAINE 50 MG/G
1 PATCH TOPICAL ONCE
Status: DISCONTINUED | OUTPATIENT
Start: 2022-09-29 | End: 2022-09-29 | Stop reason: HOSPADM

## 2022-09-29 RX ADMIN — ACETAMINOPHEN 975 MG: 325 TABLET, FILM COATED ORAL at 11:08

## 2022-09-29 RX ADMIN — LIDOCAINE 5% 1 PATCH: 700 PATCH TOPICAL at 11:08

## 2022-09-29 RX ADMIN — IBUPROFEN 600 MG: 600 TABLET, FILM COATED ORAL at 11:41

## 2022-09-29 NOTE — ED PROVIDER NOTES
History  Chief Complaint   Patient presents with    Headache     Patient presents to the ED for headache since yesterday, unrelieved with motrin  Patient states the pain is also in her neck now  Patient denies history of migraines at all  Patient is a 30-year-old female presenting to the ED for evaluation of a headache and sore throat x1 day  Patient developed a gradual onset headache yesterday afternoon  The headache was not sudden in onset and is not the worst headache of her life  She admits to a history of migraines when she was younger and says that this is not nearly as severe  Headache is primarily in the occipital region and radiates to the cervical paraspinal muscles  She did not take any medications prior to arrival   She denies any neck stiffness, visual disturbances, photophobia, nausea, vomiting, syncopal episodes or rash  She also reports a mild sore throat and bilateral otalgia  She had a low-grade temperature last night of 99 3° F  She denies any chest pain, SOB, cough, abdominal pain, vomiting, diarrhea or urinary symptoms  She denies any sick contacts or known COVID exposures  She took a home COVID test this morning that was negative  None       Past Medical History:   Diagnosis Date    Ear disease     chronic cyst     Hearing loss     Insomnia     Last assessed 7/15/2014        Past Surgical History:   Procedure Laterality Date    EAR SURGERY Left     Foreign body removed     TONSILECTOMY AND ADNOIDECTOMY      TYMPANOSTOMY TUBE PLACEMENT Right        Family History   Problem Relation Age of Onset    Allergies Mother     Diabetes type II Paternal Grandfather      I have reviewed and agree with the history as documented      E-Cigarette/Vaping    E-Cigarette Use Never User      E-Cigarette/Vaping Substances     Social History     Tobacco Use    Smoking status: Former Smoker     Years: 3 00     Quit date:      Years since quittin 7    Smokeless tobacco: Never Used   Vaping Use    Vaping Use: Never used   Substance Use Topics    Alcohol use: Not Currently    Drug use: No       Review of Systems   Constitutional: Positive for fever (low-grade, 99 3)  Negative for appetite change, chills and fatigue  HENT: Positive for ear pain (bilateral) and sore throat  Negative for congestion, rhinorrhea, sinus pressure and sinus pain  Eyes: Negative for photophobia and visual disturbance  Respiratory: Positive for cough  Negative for shortness of breath and wheezing  Cardiovascular: Negative for chest pain, palpitations and leg swelling  Gastrointestinal: Negative for abdominal pain, blood in stool, constipation, diarrhea, nausea and vomiting  Genitourinary: Negative for difficulty urinating, dysuria, flank pain, frequency, hematuria and urgency  Musculoskeletal: Positive for neck pain  Negative for arthralgias, back pain, joint swelling and myalgias  Skin: Negative for rash  Neurological: Positive for headaches  Negative for dizziness, syncope, weakness and light-headedness  All other systems reviewed and are negative  Physical Exam  Physical Exam  Vitals and nursing note reviewed  Constitutional:       General: She is awake  Appearance: Normal appearance  She is well-developed  She is not toxic-appearing or diaphoretic  Comments: Patient resting comfortably on the stretcher, no acute distress  HENT:      Head: Normocephalic and atraumatic  Right Ear: External ear normal       Left Ear: External ear normal       Ears:      Comments: Right ear:  Tympanostomy tube in place  No evidence of infection  Left ear:  Cholesteatoma and small polyp (patient is aware of these and they were noted on prior visit with ENT)  No evidence of OM or OE  Nose: Nose normal       Mouth/Throat:      Lips: Pink  Mouth: Mucous membranes are moist       Comments: Mild pharyngeal erythema  Uvula is midline without deviation  No exudate    No evidence of tonsillar abscess  Eyes:      General: Lids are normal  No visual field deficit or scleral icterus  Conjunctiva/sclera: Conjunctivae normal       Pupils: Pupils are equal, round, and reactive to light  Comments: Pupils equal and reactive to light bilaterally  No nystagmus  Neck:      Meningeal: Brudzinski's sign and Kernig's sign absent  Comments: Patient able to move neck freely in all directions  No nuchal rigidity  Cardiovascular:      Rate and Rhythm: Normal rate and regular rhythm  Pulses: Normal pulses  Radial pulses are 2+ on the right side and 2+ on the left side  Heart sounds: Normal heart sounds, S1 normal and S2 normal    Pulmonary:      Effort: Pulmonary effort is normal  No accessory muscle usage  Breath sounds: Normal breath sounds  No stridor  No decreased breath sounds, wheezing, rhonchi or rales  Abdominal:      General: Abdomen is flat  Bowel sounds are normal  There is no distension  Palpations: Abdomen is soft  Tenderness: There is no abdominal tenderness  There is no right CVA tenderness, left CVA tenderness, guarding or rebound  Musculoskeletal:      Cervical back: Full passive range of motion without pain and neck supple  No signs of trauma  No pain with movement  Right lower leg: No edema  Left lower leg: No edema  Lymphadenopathy:      Cervical: No cervical adenopathy  Skin:     General: Skin is warm and dry  Capillary Refill: Capillary refill takes less than 2 seconds  Coloration: Skin is not cyanotic, jaundiced or pale  Neurological:      General: No focal deficit present  Mental Status: She is alert and oriented to person, place, and time  GCS: GCS eye subscore is 4  GCS verbal subscore is 5  GCS motor subscore is 6  Cranial Nerves: No dysarthria or facial asymmetry  Sensory: Sensation is intact  Motor: Motor function is intact  Coordination: Coordination is intact  Gait: Gait is intact  Gait normal       Comments: No focal neurological deficits  Normal strength and sensation bilaterally  Normal coordination including addition finger-to-nose  Negative pronator drift  Normal steady gait without ataxia  Psychiatric:         Mood and Affect: Mood normal          Speech: Speech normal          Behavior: Behavior is cooperative  Vital Signs  ED Triage Vitals   Temperature Pulse Respirations Blood Pressure SpO2   09/29/22 1021 09/29/22 1021 09/29/22 1021 09/29/22 1021 09/29/22 1021   98 °F (36 7 °C) 98 20 144/84 100 %      Temp Source Heart Rate Source Patient Position - Orthostatic VS BP Location FiO2 (%)   09/29/22 1021 09/29/22 1021 09/29/22 1030 09/29/22 1030 --   Temporal Monitor Lying Right arm       Pain Score       --                  Vitals:    09/29/22 1021 09/29/22 1030   BP: 144/84 146/86   Pulse: 98 92   Patient Position - Orthostatic VS:  Lying         Visual Acuity      ED Medications  Medications   acetaminophen (TYLENOL) tablet 975 mg (975 mg Oral Given 9/29/22 1108)   ibuprofen (MOTRIN) tablet 600 mg (600 mg Oral Given 9/29/22 1141)       Diagnostic Studies  Results Reviewed     Procedure Component Value Units Date/Time    Strep A PCR [[id number]]  (Normal) Collected: 09/29/22 1110    Lab Status: Final result Specimen: Throat Updated: 09/29/22 1145     STREP A PCR Not Detected    POCT pregnancy, urine [[id number]]  (Normal) Resulted: 09/29/22 1110    Lab Status: Final result Updated: 09/29/22 1110     EXT PREG TEST UR (Ref: Negative) negative     Control valid                 No orders to display              Procedures  Procedures         ED Course                                             MDM  Number of Diagnoses or Management Options  Headache  Pharyngitis  Viral syndrome  Diagnosis management comments: Patient is a 53-year-old female presenting to the ED for evaluation of a headache and sore throat x1 day   Headache is not severe and was not sudden in onset  No neurological deficits or concerning features  No signs/symptoms to suggest an acute intracranial process  Patient declines COVID testing  Strep swab negative  Patient's headache improved with tylenol/motrin  Patient's symptoms are consistent with a viral illness  She was encouraged to rest, stay well hydrated and take tylenol/motrin as needed for symptoms  Encouraged prompt follow-up with PCP or return to the ED for any new/worsening symptoms  The management plan was discussed in detail with the patient at bedside and all questions were answered  Strict ED return instructions were discussed at bedside  Prior to discharge, both verbal and written instructions were provided  We discussed the signs and symptoms that should prompt the patient to return to the ED  All questions were answered and the patient was comfortable with the plan of care and discharged home  The patient agrees to return to the Emergency Department for concerns and/or progression of illness  Amount and/or Complexity of Data Reviewed  Clinical lab tests: ordered and reviewed    Patient Progress  Patient progress: stable      Disposition  Final diagnoses:   Viral syndrome   Headache   Pharyngitis     Time reflects when diagnosis was documented in both MDM as applicable and the Disposition within this note     Time User Action Codes Description Comment    9/29/2022 12:01 PM Izola Peasant Add [B34 9] Viral syndrome     9/29/2022 12:02 PM Izola Peasant Add [R51 9] Headache     9/29/2022 12:02 PM Izola Peasant Add [J02 9] Pharyngitis       ED Disposition     ED Disposition   Discharge    Condition   Stable    Date/Time   Thu Sep 29, 2022 12:01 PM    Comment   Daniel Connor discharge to home/self care                 Follow-up Information     Follow up With Specialties Details Why Contact Info Additional Information    Britney Macario,  Family Medicine Schedule an appointment as soon as possible for a visit   [address] [facility] [address] [place] [address] Emergency Department Emergency Medicine  If symptoms worsen [facility] [address] [phone]  [facility] Emergency Department, [address], [facility]          There are no discharge medications for this patient  No discharge procedures on file      PDMP Review     None          ED Provider  Electronically Signed by           Orville Torres PA-C  09/29/22 3663

## 2022-10-13 ENCOUNTER — OFFICE VISIT (OUTPATIENT)
Dept: FAMILY MEDICINE CLINIC | Facility: CLINIC | Age: 27
End: 2022-10-13
Payer: COMMERCIAL

## 2022-10-13 VITALS
HEART RATE: 75 BPM | WEIGHT: 227 LBS | TEMPERATURE: 97.1 F | HEIGHT: 64 IN | DIASTOLIC BLOOD PRESSURE: 82 MMHG | BODY MASS INDEX: 38.76 KG/M2 | SYSTOLIC BLOOD PRESSURE: 126 MMHG | OXYGEN SATURATION: 99 %

## 2022-10-13 DIAGNOSIS — R42 DIZZINESS: ICD-10-CM

## 2022-10-13 DIAGNOSIS — Z86.69 HISTORY OF CHOLESTEATOMA: ICD-10-CM

## 2022-10-13 DIAGNOSIS — R51.9 INTRACTABLE HEADACHE, UNSPECIFIED CHRONICITY PATTERN, UNSPECIFIED HEADACHE TYPE: Primary | ICD-10-CM

## 2022-10-13 PROCEDURE — 99213 OFFICE O/P EST LOW 20 MIN: CPT | Performed by: PHYSICIAN ASSISTANT

## 2022-10-13 NOTE — PROGRESS NOTES
Assessment/Plan:    Problem List Items Addressed This Visit        Other    Dizziness    Relevant Orders    CBC    Comprehensive metabolic panel    TSH, 3rd generation with Free T4 reflex    Lyme Total Antibody Profile with reflex to WB    CT orbits/temporal bones/skull base wo contrast      Other Visit Diagnoses     Intractable headache, unspecified chronicity pattern, unspecified headache type    -  Primary    Check updated CT head, compare to CT in August     Relevant Orders    CBC    Comprehensive metabolic panel    TSH, 3rd generation with Free T4 reflex    Lyme Total Antibody Profile with reflex to WB    CT orbits/temporal bones/skull base wo contrast    History of cholesteatoma        Relevant Orders    CT orbits/temporal bones/skull base wo contrast           Diagnoses and all orders for this visit:    Intractable headache, unspecified chronicity pattern, unspecified headache type  Comments:  Check updated CT head, compare to CT in August   Orders:  -     CBC; Future  -     Comprehensive metabolic panel; Future  -     TSH, 3rd generation with Free T4 reflex; Future  -     Lyme Total Antibody Profile with reflex to WB; Future  -     CT orbits/temporal bones/skull base wo contrast; Future    Dizziness  Comments:  Check labs, keep F/U with ENT  Orders:  -     CBC; Future  -     Comprehensive metabolic panel; Future  -     TSH, 3rd generation with Free T4 reflex; Future  -     Lyme Total Antibody Profile with reflex to WB; Future  -     CT orbits/temporal bones/skull base wo contrast; Future    History of cholesteatoma  -     CT orbits/temporal bones/skull base wo contrast; Future      No problem-specific Assessment & Plan notes found for this encounter  Subjective:      Patient ID: Elein Gallegos is a 24 y o  female  Keisha Fill is here today complaining of daily headache x 2 weeks but also of long time dizziness x years  Pt has been seen by ENT, H/O cholesteatoma removed L ear years ago   Per pt, ENT thinks she has another one, however she states radiology does not feel she has another one  She is being evaluated by another ENT in December  The following portions of the patient's history were reviewed and updated as appropriate:   She has a past medical history of Ear disease, Hearing loss, and Insomnia ,  does not have any pertinent problems on file  ,   has a past surgical history that includes Tympanostomy tube placement (Right); TONSILECTOMY AND ADNOIDECTOMY; and EAR SURGERY (Left)  ,  family history includes Allergies in her mother; Diabetes type II in her paternal grandfather  ,   reports that she quit smoking about 9 years ago  She quit after 3 00 years of use  She has never used smokeless tobacco  She reports previous alcohol use  She reports that she does not use drugs  ,  is allergic to augmentin [amoxicillin-pot clavulanate], bactrim [sulfamethoxazole-trimethoprim], and coconut oil - food allergy     No current outpatient medications on file  No current facility-administered medications for this visit  Review of Systems   Constitutional: Negative for activity change, appetite change, chills, diaphoresis, fatigue, fever and unexpected weight change  HENT: Negative for congestion, ear pain, postnasal drip, rhinorrhea, sinus pressure, sinus pain, sneezing, sore throat, tinnitus and voice change  Eyes: Negative for pain, redness and visual disturbance  Respiratory: Negative for cough, chest tightness, shortness of breath and wheezing  Cardiovascular: Negative for chest pain, palpitations and leg swelling  Gastrointestinal: Negative for abdominal pain, blood in stool, constipation, diarrhea, nausea and vomiting  Genitourinary: Negative for difficulty urinating, dysuria, frequency, hematuria and urgency  Musculoskeletal: Negative for arthralgias, back pain, gait problem, joint swelling, myalgias, neck pain and neck stiffness     Skin: Negative for color change, pallor, rash and wound    Neurological: Positive for dizziness, light-headedness and headaches  Negative for tremors, syncope and weakness  Psychiatric/Behavioral: Negative for confusion, dysphoric mood, self-injury, sleep disturbance and suicidal ideas  The patient is not nervous/anxious  Objective:  Vitals:    10/13/22 1303   BP: 126/82   BP Location: Left arm   Patient Position: Sitting   Cuff Size: Standard   Pulse: 75   Temp: (!) 97 1 °F (36 2 °C)   TempSrc: Temporal   SpO2: 99%   Weight: 103 kg (227 lb)   Height: 5' 4" (1 626 m)     Body mass index is 38 96 kg/m²  Physical Exam  Vitals reviewed  Constitutional:       General: She is not in acute distress  Appearance: She is well-developed  She is obese  She is not diaphoretic  HENT:      Head: Normocephalic and atraumatic  Ears:      Comments: R TM + T tube     Mouth/Throat:      Pharynx: Uvula midline  No oropharyngeal exudate  Eyes:      General: No scleral icterus  Right eye: No discharge  Left eye: No discharge  Conjunctiva/sclera: Conjunctivae normal    Neck:      Thyroid: No thyromegaly  Vascular: No carotid bruit  Cardiovascular:      Rate and Rhythm: Normal rate and regular rhythm  Heart sounds: Normal heart sounds  No murmur heard  Pulmonary:      Effort: Pulmonary effort is normal  No respiratory distress  Breath sounds: Normal breath sounds  No wheezing  Abdominal:      General: Bowel sounds are normal  There is no distension  Palpations: Abdomen is soft  There is no mass  Tenderness: There is no abdominal tenderness  There is no guarding or rebound  Musculoskeletal:         General: No tenderness  Normal range of motion  Cervical back: Neck supple  Lymphadenopathy:      Cervical: No cervical adenopathy  Skin:     General: Skin is warm and dry  Findings: No erythema or rash  Neurological:      Mental Status: She is alert and oriented to person, place, and time  Psychiatric:         Behavior: Behavior normal          Thought Content:  Thought content normal          Judgment: Judgment normal

## 2022-10-14 ENCOUNTER — TELEPHONE (OUTPATIENT)
Dept: ADMINISTRATIVE | Facility: OTHER | Age: 27
End: 2022-10-14

## 2022-10-14 NOTE — TELEPHONE ENCOUNTER
Upon review of the In Basket request and the patient's chart, initial outreach has been made via fax, please see Contacts section for details       Thank you  Carlos Peters

## 2022-10-14 NOTE — TELEPHONE ENCOUNTER
----- Message from Lisset Lazar sent at 10/13/2022  1:05 PM EDT -----  10/13/22 1:06 PM    Jeffrey, our patient Brenda Quintanilla has had Pap Smear (HPV) aka Cervical Cancer Screening completed/performed  Please assist in updating the patient chart by making an External outreach to Dr Marvie Boeck facility located in [address] [address]  The date of service is October 2021      Thank you,  Mary Ann Patel   [facility]

## 2022-10-14 NOTE — LETTER
Labs are in chart now   Procedure Request Form: Cervical Cancer Screening      Date Requested: 10/14/22  Patient: Sarah Brooks  Patient : 1995   Referring Provider: Shahida Alvarado, DO        Date of Procedure ______________________________       The above patient has informed us that they have completed their   most recent Cervical Cancer Screening at your facility  Please complete   this form and attach all corresponding procedure reports/results  Comments __________________________________________________________  ____________________________________________________________________  ____________________________________________________________________  ____________________________________________________________________    Facility Completing Procedure _________________________________________    Form Completed By (print name) _______________________________________      Signature __________________________________________________________      These reports are needed for  compliance  Please fax this completed form and a copy of the procedure report to our office located at [place] [address] as soon as possible to [phone] aziza Giron: Phone [phone]    We thank you for your assistance in treating our mutual patient

## 2022-10-14 NOTE — TELEPHONE ENCOUNTER
Upon review of the In Basket request we were able to locate, review, and update the patient chart as requested for Pap Smear (HPV) aka Cervical Cancer Screening  Patient had pap completed 3/30/21 and no showed for appointment that was scheduled 4/2022  Any additional questions or concerns should be emailed to the Practice Liaisons via the appropriate education email address, please do not reply via In Basket      Thank you  Laure Lyles

## 2022-10-15 ENCOUNTER — LAB (OUTPATIENT)
Dept: LAB | Facility: MEDICAL CENTER | Age: 27
End: 2022-10-15
Payer: COMMERCIAL

## 2022-10-15 DIAGNOSIS — R51.9 INTRACTABLE HEADACHE, UNSPECIFIED CHRONICITY PATTERN, UNSPECIFIED HEADACHE TYPE: ICD-10-CM

## 2022-10-15 DIAGNOSIS — R42 DIZZINESS: ICD-10-CM

## 2022-10-15 PROCEDURE — 85027 COMPLETE CBC AUTOMATED: CPT

## 2022-10-15 PROCEDURE — 84443 ASSAY THYROID STIM HORMONE: CPT

## 2022-10-15 PROCEDURE — 36415 COLL VENOUS BLD VENIPUNCTURE: CPT

## 2022-10-15 PROCEDURE — 80053 COMPREHEN METABOLIC PANEL: CPT

## 2022-10-15 PROCEDURE — 86618 LYME DISEASE ANTIBODY: CPT

## 2022-10-16 LAB
ALBUMIN SERPL BCP-MCNC: 3.9 G/DL (ref 3.5–5)
ALP SERPL-CCNC: 81 U/L (ref 46–116)
ALT SERPL W P-5'-P-CCNC: 49 U/L (ref 12–78)
ANION GAP SERPL CALCULATED.3IONS-SCNC: 1 MMOL/L (ref 4–13)
AST SERPL W P-5'-P-CCNC: 31 U/L (ref 5–45)
BILIRUB SERPL-MCNC: 0.34 MG/DL (ref 0.2–1)
BUN SERPL-MCNC: 14 MG/DL (ref 5–25)
CALCIUM SERPL-MCNC: 8.7 MG/DL (ref 8.3–10.1)
CHLORIDE SERPL-SCNC: 108 MMOL/L (ref 96–108)
CO2 SERPL-SCNC: 27 MMOL/L (ref 21–32)
CREAT SERPL-MCNC: 0.74 MG/DL (ref 0.6–1.3)
ERYTHROCYTE [DISTWIDTH] IN BLOOD BY AUTOMATED COUNT: 14.9 % (ref 11.6–15.1)
GFR SERPL CREATININE-BSD FRML MDRD: 112 ML/MIN/1.73SQ M
GLUCOSE P FAST SERPL-MCNC: 87 MG/DL (ref 65–99)
HCT VFR BLD AUTO: 36.7 % (ref 34.8–46.1)
HGB BLD-MCNC: 11.2 G/DL (ref 11.5–15.4)
MCH RBC QN AUTO: 26.7 PG (ref 26.8–34.3)
MCHC RBC AUTO-ENTMCNC: 30.5 G/DL (ref 31.4–37.4)
MCV RBC AUTO: 87 FL (ref 82–98)
PLATELET # BLD AUTO: 380 THOUSANDS/UL (ref 149–390)
PMV BLD AUTO: 10 FL (ref 8.9–12.7)
POTASSIUM SERPL-SCNC: 4.3 MMOL/L (ref 3.5–5.3)
PROT SERPL-MCNC: 7 G/DL (ref 6.4–8.4)
RBC # BLD AUTO: 4.2 MILLION/UL (ref 3.81–5.12)
SODIUM SERPL-SCNC: 136 MMOL/L (ref 135–147)
TSH SERPL DL<=0.05 MIU/L-ACNC: 0.69 UIU/ML (ref 0.45–4.5)
WBC # BLD AUTO: 6.44 THOUSAND/UL (ref 4.31–10.16)

## 2022-10-17 ENCOUNTER — TELEPHONE (OUTPATIENT)
Dept: FAMILY MEDICINE CLINIC | Facility: CLINIC | Age: 27
End: 2022-10-17

## 2022-10-17 LAB — B BURGDOR IGG+IGM SER-ACNC: 0.3 AI

## 2022-10-19 ENCOUNTER — HOSPITAL ENCOUNTER (OUTPATIENT)
Dept: CT IMAGING | Facility: HOSPITAL | Age: 27
Discharge: HOME | End: 2022-10-19
Payer: COMMERCIAL

## 2022-10-19 DIAGNOSIS — R42 DIZZINESS: ICD-10-CM

## 2022-10-19 DIAGNOSIS — R51.9 INTRACTABLE HEADACHE, UNSPECIFIED CHRONICITY PATTERN, UNSPECIFIED HEADACHE TYPE: ICD-10-CM

## 2022-10-19 DIAGNOSIS — Z86.69 HISTORY OF CHOLESTEATOMA: ICD-10-CM

## 2022-10-19 PROCEDURE — G1004 CDSM NDSC: HCPCS

## 2022-10-19 PROCEDURE — 70480 CT ORBIT/EAR/FOSSA W/O DYE: CPT

## 2022-10-24 ENCOUNTER — TELEPHONE (OUTPATIENT)
Dept: FAMILY MEDICINE CLINIC | Facility: CLINIC | Age: 27
End: 2022-10-24

## 2022-10-24 NOTE — TELEPHONE ENCOUNTER
Let her know I have not reviewed it yet  When I review it, we will call her  The more messages sent to us asking about results fills our inbaskets and makes the wait longer to get to results

## 2022-11-13 ENCOUNTER — HOSPITAL ENCOUNTER (EMERGENCY)
Facility: HOSPITAL | Age: 27
Discharge: HOME | End: 2022-11-13
Attending: EMERGENCY MEDICINE

## 2022-11-13 VITALS
SYSTOLIC BLOOD PRESSURE: 130 MMHG | WEIGHT: 224 LBS | OXYGEN SATURATION: 99 % | BODY MASS INDEX: 38.24 KG/M2 | HEIGHT: 64 IN | TEMPERATURE: 97.3 F | DIASTOLIC BLOOD PRESSURE: 63 MMHG | RESPIRATION RATE: 18 BRPM | HEART RATE: 70 BPM

## 2022-11-13 DIAGNOSIS — H71.02 CHOLESTEATOMA OF ATTIC OF EAR, LEFT: ICD-10-CM

## 2022-11-13 DIAGNOSIS — H83.09 LABYRINTHITIS: Primary | ICD-10-CM

## 2022-11-13 DIAGNOSIS — H66.90 OTITIS MEDIA: ICD-10-CM

## 2022-11-13 RX ORDER — CEFDINIR 300 MG/1
300 CAPSULE ORAL EVERY 12 HOURS SCHEDULED
Qty: 14 CAPSULE | Refills: 0 | Status: SHIPPED | OUTPATIENT
Start: 2022-11-13 | End: 2022-11-20

## 2022-11-13 RX ORDER — MECLIZINE HCL 12.5 MG/1
50 TABLET ORAL ONCE
Status: COMPLETED | OUTPATIENT
Start: 2022-11-13 | End: 2022-11-13

## 2022-11-13 RX ORDER — CEFDINIR 300 MG/1
300 CAPSULE ORAL ONCE
Status: COMPLETED | OUTPATIENT
Start: 2022-11-13 | End: 2022-11-13

## 2022-11-13 RX ORDER — MECLIZINE HYDROCHLORIDE 25 MG/1
25 TABLET ORAL EVERY 8 HOURS PRN
Qty: 12 TABLET | Refills: 0 | Status: SHIPPED | OUTPATIENT
Start: 2022-11-13

## 2022-11-13 RX ADMIN — CEFDINIR 300 MG: 300 CAPSULE ORAL at 14:00

## 2022-11-13 RX ADMIN — MECLIZINE 50 MG: 12.5 TABLET ORAL at 12:01

## 2022-11-13 NOTE — ED PROVIDER NOTES
History  Chief Complaint   Patient presents with   • Dizziness     Pt states has been seen for this and has appointment with ENT on Tuesday, had a CT for same a few weeks ago " no one can tell me whats wrong"      22-year-old female presents emergency room with vertiginous symptoms  The patient notes that she has a history of a radical left mastoidectomy and cholesteatoma removal and notes that she has had a CT scan less than a month ago  CT scan reports that there is either granulation tissue or possible recurrence of cholesteatoma  Patient is complaining of increased pressure and vertiginous symptoms at rest and with movement  Patient denies any fever chills or trauma  Patient was urged to come to the emergency room due to her symptoms  Patient does have an appointment with her ENT doctor regarding this CT finding in 2 days  None       Past Medical History:   Diagnosis Date   • Ear disease     chronic cyst    • Hearing loss    • Insomnia     Last assessed 7/15/2014        Past Surgical History:   Procedure Laterality Date   • EAR SURGERY Left     Foreign body removed    • TONSILECTOMY AND ADNOIDECTOMY     • TYMPANOSTOMY TUBE PLACEMENT Right        Family History   Problem Relation Age of Onset   • Allergies Mother    • Diabetes type II Paternal Grandfather      I have reviewed and agree with the history as documented  E-Cigarette/Vaping   • E-Cigarette Use Never User      E-Cigarette/Vaping Substances     Social History     Tobacco Use   • Smoking status: Former Smoker     Years: 3 00     Quit date:      Years since quittin 8   • Smokeless tobacco: Never Used   Vaping Use   • Vaping Use: Never used   Substance Use Topics   • Alcohol use: Not Currently   • Drug use: No       Review of Systems   Constitutional: Positive for activity change  Negative for chills and fever  HENT: Positive for ear pain  Negative for ear discharge and sore throat      Eyes: Negative for pain and visual disturbance  Respiratory: Negative for cough and shortness of breath  Cardiovascular: Negative for chest pain and palpitations  Gastrointestinal: Negative for abdominal pain and vomiting  Genitourinary: Negative for dysuria and hematuria  Musculoskeletal: Negative for arthralgias and back pain  Skin: Negative for color change and rash  Neurological: Positive for dizziness  Negative for seizures and syncope  All other systems reviewed and are negative  Physical Exam  Physical Exam  Vitals and nursing note reviewed  Constitutional:       General: She is not in acute distress  Appearance: Normal appearance  She is well-developed  HENT:      Head: Normocephalic and atraumatic  Right Ear: Tympanic membrane and external ear normal       Left Ear: External ear normal       Ears:      Comments: Patient with visible yellow green material behind the left tympanic membrane which may either be purulence, cholesteatoma, or granulation tissue  There appears to be a perforation in the tympanic membrane  There does not appear any be any blood or drainage from the ear     Nose: Nose normal       Mouth/Throat:      Mouth: Mucous membranes are moist    Eyes:      Conjunctiva/sclera: Conjunctivae normal    Cardiovascular:      Rate and Rhythm: Normal rate and regular rhythm  Pulses: Normal pulses  Heart sounds: Normal heart sounds  No murmur heard  Pulmonary:      Effort: Pulmonary effort is normal  No respiratory distress  Breath sounds: Normal breath sounds  Abdominal:      General: Bowel sounds are normal       Palpations: Abdomen is soft  Tenderness: There is no abdominal tenderness  Musculoskeletal:         General: No swelling, tenderness or deformity  Cervical back: Neck supple  Skin:     General: Skin is warm and dry  Capillary Refill: Capillary refill takes less than 2 seconds  Neurological:      General: No focal deficit present        Mental Status: She is alert and oriented to person, place, and time  Mental status is at baseline  Vital Signs  ED Triage Vitals   Temperature Pulse Respirations Blood Pressure SpO2   11/13/22 1134 11/13/22 1134 11/13/22 1134 11/13/22 1134 11/13/22 1134   (!) 97 3 °F (36 3 °C) 74 18 112/60 99 %      Temp Source Heart Rate Source Patient Position - Orthostatic VS BP Location FiO2 (%)   11/13/22 1134 11/13/22 1320 11/13/22 1320 11/13/22 1320 --   Oral Monitor Sitting Left arm       Pain Score       11/13/22 1134       No Pain           Vitals:    11/13/22 1134 11/13/22 1320   BP: 112/60 130/63   Pulse: 74 70   Patient Position - Orthostatic VS:  Sitting         Visual Acuity      ED Medications  Medications   meclizine (ANTIVERT) tablet 50 mg (50 mg Oral Given 11/13/22 1201)   cefdinir (OMNICEF) capsule 300 mg (300 mg Oral Given 11/13/22 1400)       Diagnostic Studies  Results Reviewed     None                 No orders to display              Procedures  Procedures         ED Course  ED Course as of 11/13/22 1648   Sun Nov 13, 2022   1326 Ambulated the patient without much difficulty  Patient notes she is about 25% improved after meclizine  I feel that the patient's symptoms are likely due to the pathology of the patient's left inner ear  The patient has vertiginous symptoms with left ear pressure  Exam the left ear as well as CT scanner abnormal   The patient currently has an evaluation with ENT on Tuesday, and I offered the patient a repeat CT scan however she just had 1 in October  The patient has decided to wait to see ENT  The patient will be placed on antibiotic and meclizine in the interim  Patient will return if worse  SBIRT 22yo+    Flowsheet Row Most Recent Value   SBIRT (25 yo +)    In order to provide better care to our patients, we are screening all of our patients for alcohol and drug use  Would it be okay to ask you these screening questions?  Yes Filed at: 11/13/2022 3230 Initial Alcohol Screen: US AUDIT-C     1  How often do you have a drink containing alcohol? 0 Filed at: 11/13/2022 1308   2  How many drinks containing alcohol do you have on a typical day you are drinking? 0 Filed at: 11/13/2022 1308   3b  FEMALE Any Age, or MALE 65+: How often do you have 4 or more drinks on one occassion? 0 Filed at: 11/13/2022 1308   Audit-C Score 0 Filed at: 11/13/2022 1308   MELO: How many times in the past year have you    Used an illegal drug or used a prescription medication for non-medical reasons? Never Filed at: 11/13/2022 1308                    MDM    Disposition  Final diagnoses:   Labyrinthitis   Cholesteatoma of attic of ear, left   Otitis media     Time reflects when diagnosis was documented in both MDM as applicable and the Disposition within this note     Time User Action Codes Description Comment    11/13/2022  1:30 PM Foisaac Rafael Add [R91 77] Labyrinthitis     11/13/2022  1:31 PM Peterson Bridges Add [H71 02] Cholesteatoma of attic of ear, left     11/13/2022  1:33 PM Foye Rafael Add [H66 90] Otitis media       ED Disposition     ED Disposition   Discharge    Condition   Stable    Date/Time   Sun Nov 13, 2022  1:30 PM    Comment   Kyle Adamson discharge to home/self care  Follow-up Information     Follow up With Specialties Details Why Contact Bigg Marie,  Family Medicine On 11/18/2022  [address]  [phone]            Discharge Medication List as of 11/13/2022  1:36 PM      START taking these medications    Details   cefdinir (OMNICEF) 300 mg capsule Take 1 capsule (300 mg total) by mouth every 12 (twelve) hours for 7 days, Starting Sun 11/13/2022, Until Sun 11/20/2022, Normal      meclizine (ANTIVERT) 25 mg tablet Take 1 tablet (25 mg total) by mouth every 8 (eight) hours as needed for dizziness, Starting Sun 11/13/2022, Normal             No discharge procedures on file      PDMP Review     None ED Provider  Electronically Signed by           Mile Goldsmith DO  11/13/22 8128

## 2022-11-13 NOTE — DISCHARGE INSTRUCTIONS
Return to the ER for any new, concerning, worsening issues  Use meclizine 1 pill every 8 hours as needed for dizziness  I recommend you not drive or operate heavy machinery over the next few days until you see ENT  Use Omnicef 1 pill twice a day for week to treat possible infection  Follow-up with your ENT doctor on Tuesday morning

## 2022-11-15 ENCOUNTER — OFFICE VISIT (OUTPATIENT)
Dept: OTOLARYNGOLOGY | Facility: CLINIC | Age: 27
End: 2022-11-15

## 2022-11-15 VITALS
BODY MASS INDEX: 38.89 KG/M2 | DIASTOLIC BLOOD PRESSURE: 74 MMHG | OXYGEN SATURATION: 99 % | TEMPERATURE: 98.4 F | WEIGHT: 227.8 LBS | HEIGHT: 64 IN | HEART RATE: 98 BPM | SYSTOLIC BLOOD PRESSURE: 110 MMHG

## 2022-11-15 DIAGNOSIS — Z90.89 H/O MASTOIDECTOMY: ICD-10-CM

## 2022-11-15 DIAGNOSIS — H90.6 MIXED CONDUCTIVE AND SENSORINEURAL HEARING LOSS OF BOTH EARS: Primary | ICD-10-CM

## 2022-11-15 DIAGNOSIS — Z86.69 HISTORY OF CHOLESTEATOMA: ICD-10-CM

## 2022-11-15 DIAGNOSIS — H71.92 CHOLESTEATOMA OF LEFT EAR: ICD-10-CM

## 2022-11-15 NOTE — PROGRESS NOTES
Review of Systems   Constitutional: Negative  HENT: Negative  Eyes: Negative  Respiratory: Negative  Cardiovascular: Negative  Gastrointestinal: Negative  Endocrine: Negative  Genitourinary: Negative  Musculoskeletal: Negative  Skin: Negative  Allergic/Immunologic: Negative  Neurological: Positive for dizziness and headaches  Hematological: Negative  Psychiatric/Behavioral: Negative

## 2022-11-15 NOTE — PROGRESS NOTES
Otolaryngology Clinic Visit  Name:  Virginia Pardo  MRN:  [id number]  Date:  11/15/2022 8:46 AM  ________________________________________________________________________      CHIEF COMPLAINT:   Ear issues     HPI:  Virginia Pardo is a 24 y o  female with PMH significant for left ear radical mastoidectomy when she was 12 and current right ear tube s/p 6 years ago who presents with concerns of ear issues  She states her left ear has been draining for about 1 5 years  She has tried multiple drops and oral antibiotics which seem to help at the time but the draining returns  Hearing is poor on that side has previous imaging as below  Audiogram as below  Interval HPI:  Patient has had worsening vertigo  Occurs intermittently 4-5 minutes at a time  Been to the ER twice since the last visit  Having pressure on the L side  Describes it as throbbing  Has had some residual drainage  Does have appt with Dr Emilee Sagastume       PMHx:  Past Medical History:   Diagnosis Date   • Ear disease     chronic cyst    • Hearing loss    • Insomnia     Last assessed 7/15/2014        PSHx:  Past Surgical History:   Procedure Laterality Date   • EAR SURGERY Left     Foreign body removed    • TONSILECTOMY AND ADNOIDECTOMY     • TYMPANOSTOMY TUBE PLACEMENT Right        FAMHx:  Family History   Problem Relation Age of Onset   • Allergies Mother    • Diabetes type II Paternal Grandfather        SOCHx:  Social History     Socioeconomic History   • Marital status: /Civil Union     Spouse name: Not on file   • Number of children: Not on file   • Years of education: Not on file   • Highest education level: Not on file   Occupational History   • Not on file   Tobacco Use   • Smoking status: Former Smoker     Years: 3      Quit date:      Years since quittin 8   • Smokeless tobacco: Never Used   Vaping Use   • Vaping Use: Never used   Substance and Sexual Activity   • Alcohol use: Not Currently   • Drug use: No   • Sexual activity: Not on file   Other Topics Concern   • Not on file   Social History Narrative    Living with single parent    No living will      Social Determinants of Health     Financial Resource Strain: Not on file   Food Insecurity: Not on file   Transportation Needs: Not on file   Physical Activity: Not on file   Stress: Not on file   Social Connections: Not on file   Intimate Partner Violence: Not on file   Housing Stability: Not on file       Allergies: Allergies   Allergen Reactions   • Augmentin [Amoxicillin-Pot Clavulanate] Hives   • Bactrim [Sulfamethoxazole-Trimethoprim] Hives   • Coconut Oil - Food Allergy Hives        MEDS:  Reviewed    ROS:  See MA note     PHYSICAL EXAM:  /74 (BP Location: Left arm, Cuff Size: Large)   Pulse 98   Temp 98 4 °F (36 9 °C) (Temporal)   Ht 5' 4" (1 626 m)   Wt 103 kg (227 lb 12 8 oz)   LMP 11/01/2022 (Approximate)   SpO2 99%   BMI 39 10 kg/m²   General: NAD, AOx4  Eyes:  EOMI  Ears:  Right: ear canal normal, TM with ventilation tube and surrounding perforation  Left: ear canal mildly wet, no pus evident, TM with large cholesteatoma behind Marlo TM with scutal erosion and large polyp filling the middle ear space  Nose:  No septal deviation, no inferior turbinate hypertrophy, no mass/lesions  Oral cavity:  No trismus, no mass/lesions  Neck: Trachea is midline; no thyroid nodules, Salivary glands symmetrical, no masses/abnormality on palpation  Lymph:  No cervical lymphadenopathy  Skin:  No obvious facial lesions  Neuro: Face symmetrical, no obvious cranial nerve palsy  No focal deficits   Lungs:  Normal work of breathing, symmetrical chest expansion  Vascular: Well perfused    Procedures:  None     Medical Data Reviewed:  Records reviewed and summarized as in EPIC    Radiology:  CT temporal bone reviewed    Labs:   Audiogram from last visit SNHL on the Right, with a moderate to severe mixed loss on the Left  Tympanogram from visit Type B  Reviewed personally with patient and audiology      Patient Active Problem List   Diagnosis   • Transient neurological symptoms, recurrent   • Hearing loss   • Dizziness       ASSESSMENT/PLAN:  Otto Call is a 24 y o  female with acute and chronic problems as above who presents with:    1  Mixed conductive and sensorineural hearing loss of both ears    2  H/O mastoidectomy    3  Cholesteatoma of left ear    4  History of cholesteatoma        24 y o  here today for further evaluation of chronic ear issues with left ear otorrhea for about 1 5 years  Reviewed concerns for recurrent cholesteatoma and presistent ETD  Will have her seen Dr Will Olson for her help on the matter  Her drainage and pain has cooled down   Counseled on using ciprodex with recurrent drainage    -Ciprodex x 2 weeks in case of drainage   -Dry Ear    RTC with any worsening drainage or pain otherwise Dr Will Olson in December

## 2023-02-27 ENCOUNTER — OFFICE VISIT (OUTPATIENT)
Dept: FAMILY MEDICINE CLINIC | Facility: CLINIC | Age: 28
End: 2023-02-27

## 2023-02-27 VITALS
BODY MASS INDEX: 39.27 KG/M2 | HEIGHT: 64 IN | SYSTOLIC BLOOD PRESSURE: 118 MMHG | TEMPERATURE: 97.6 F | DIASTOLIC BLOOD PRESSURE: 84 MMHG | OXYGEN SATURATION: 99 % | WEIGHT: 230 LBS | HEART RATE: 80 BPM

## 2023-02-27 DIAGNOSIS — E01.0 THYROMEGALY: ICD-10-CM

## 2023-02-27 DIAGNOSIS — G56.01 CARPAL TUNNEL SYNDROME OF RIGHT WRIST: ICD-10-CM

## 2023-02-27 DIAGNOSIS — H83.02 VESTIBULITIS OF EAR, LEFT: Primary | ICD-10-CM

## 2023-02-27 RX ORDER — CEFDINIR 300 MG/1
300 CAPSULE ORAL EVERY 12 HOURS SCHEDULED
Qty: 14 CAPSULE | Refills: 0 | Status: SHIPPED | OUTPATIENT
Start: 2023-02-27 | End: 2023-03-06

## 2023-02-27 NOTE — PROGRESS NOTES
BMI Counseling: Body mass index is 39 48 kg/m²  The BMI is above normal  Nutrition recommendations include decreasing portion sizes, encouraging healthy choices of fruits and vegetables, moderation in carbohydrate intake and increasing intake of lean protein  Exercise recommendations include exercising 3-5 times per week  Rationale for BMI follow-up plan is due to patient being overweight or obese  Assessment/Plan:    Problem List Items Addressed This Visit    None  Visit Diagnoses     Vestibulitis of ear, left    -  Primary           Diagnoses and all orders for this visit:    Vestibulitis of ear, left        No problem-specific Assessment & Plan notes found for this encounter  Subjective:      Patient ID: Jose Alejandro Payton is a 24 y o  female  Mary Ann Mejiasketan is here today chief complaint is that she has a draining infection from the left ear she goes to Dr Kristie Mcqueen the patient has vestibulitis recently was on drops which were ineffective but has previously been treated successfully with cefdinir      The following portions of the patient's history were reviewed and updated as appropriate:   She has a past medical history of Ear disease, Hearing loss, and Insomnia ,  does not have any pertinent problems on file  ,   has a past surgical history that includes Tympanostomy tube placement (Right); TONSILECTOMY AND ADNOIDECTOMY; and EAR SURGERY (Left)  ,  family history includes Allergies in her mother; Diabetes type II in her paternal grandfather  ,   reports that she quit smoking about 10 years ago  Her smoking use included cigarettes  She has never been exposed to tobacco smoke  She has never used smokeless tobacco  She reports current alcohol use  She reports that she does not use drugs  ,  is allergic to augmentin [amoxicillin-pot clavulanate], bactrim [sulfamethoxazole-trimethoprim], and coconut oil - food allergy     Current Outpatient Medications   Medication Sig Dispense Refill   • meclizine (ANTIVERT) 25 mg tablet Take 1 tablet (25 mg total) by mouth every 8 (eight) hours as needed for dizziness (Patient not taking: Reported on 11/15/2022) 12 tablet 0     No current facility-administered medications for this visit  Review of Systems   Constitutional: Negative for activity change, appetite change, diaphoresis, fatigue and fever  HENT: Positive for ear discharge and ear pain  Eyes: Negative  Respiratory: Negative for apnea, cough, chest tightness, shortness of breath and wheezing  Cardiovascular: Negative for chest pain, palpitations and leg swelling  Gastrointestinal: Negative for abdominal distention, abdominal pain, anal bleeding, constipation, diarrhea, nausea and vomiting  Endocrine: Negative for cold intolerance, heat intolerance, polydipsia, polyphagia and polyuria  Genitourinary: Negative for difficulty urinating, dysuria, flank pain, hematuria and urgency  Musculoskeletal: Negative for arthralgias, back pain, gait problem, joint swelling and myalgias  Skin: Negative for color change, rash and wound  Allergic/Immunologic: Negative for environmental allergies, food allergies and immunocompromised state  Neurological: Negative for dizziness, seizures, syncope, speech difficulty, numbness and headaches  Hematological: Negative for adenopathy  Does not bruise/bleed easily  Psychiatric/Behavioral: Negative for agitation, behavioral problems, hallucinations, sleep disturbance and suicidal ideas  Objective:  Vitals:    02/27/23 1300   BP: 118/84   BP Location: Left arm   Patient Position: Sitting   Cuff Size: Large   Pulse: 80   Temp: 97 6 °F (36 4 °C)   TempSrc: Temporal   SpO2: 99%   Weight: 104 kg (230 lb)   Height: 5' 4" (1 626 m)     Body mass index is 39 48 kg/m²  Physical Exam  Constitutional:       General: She is not in acute distress  Appearance: She is well-developed  She is not diaphoretic  HENT:      Head: Normocephalic        Comments: Right ear canal has purulent discharge unable to identify normal TM landmarks patient has had previous episodes of vestibulitis     Right Ear: External ear normal       Left Ear: External ear normal       Nose: Nose normal    Eyes:      General: No scleral icterus  Right eye: No discharge  Left eye: No discharge  Conjunctiva/sclera: Conjunctivae normal       Pupils: Pupils are equal, round, and reactive to light  Neck:      Thyroid: No thyromegaly  Trachea: No tracheal deviation  Cardiovascular:      Rate and Rhythm: Normal rate and regular rhythm  Heart sounds: Normal heart sounds  No murmur heard  No friction rub  No gallop  Pulmonary:      Effort: Pulmonary effort is normal  No respiratory distress  Breath sounds: Normal breath sounds  No wheezing  Abdominal:      General: Bowel sounds are normal       Palpations: Abdomen is soft  There is no mass  Tenderness: There is no abdominal tenderness  There is no guarding  Musculoskeletal:         General: No deformity  Cervical back: Normal range of motion  Lymphadenopathy:      Cervical: No cervical adenopathy  Skin:     General: Skin is warm and dry  Findings: No erythema or rash  Neurological:      Mental Status: She is alert and oriented to person, place, and time  Cranial Nerves: No cranial nerve deficit  Psychiatric:         Thought Content:  Thought content normal

## 2023-03-01 ENCOUNTER — HOSPITAL ENCOUNTER (OUTPATIENT)
Dept: ULTRASOUND IMAGING | Facility: HOSPITAL | Age: 28
Discharge: HOME | End: 2023-03-01

## 2023-03-01 ENCOUNTER — LAB (OUTPATIENT)
Dept: LAB | Facility: MEDICAL CENTER | Age: 28
End: 2023-03-01

## 2023-03-01 DIAGNOSIS — E01.0 THYROMEGALY: ICD-10-CM

## 2023-03-01 DIAGNOSIS — E01.0 THYROMEGALY: Primary | ICD-10-CM

## 2023-03-01 LAB
T4 FREE SERPL-MCNC: 1.13 NG/DL (ref 0.76–1.46)
T4 SERPL-MCNC: 11.2 UG/DL (ref 4.7–13.3)
TSH SERPL DL<=0.05 MIU/L-ACNC: 0.95 UIU/ML (ref 0.45–4.5)

## 2023-03-02 LAB — T3RU NFR SERPL: 25 % (ref 24–39)

## 2023-07-01 ENCOUNTER — APPOINTMENT (OUTPATIENT)
Dept: LAB | Facility: MEDICAL CENTER | Age: 28
End: 2023-07-01
Payer: COMMERCIAL

## 2023-07-01 DIAGNOSIS — H66.92 LEFT CHRONIC OTITIS MEDIA: ICD-10-CM

## 2023-07-01 DIAGNOSIS — Z96.22 RETAINED MYRINGOTOMY TUBE IN RIGHT EAR: ICD-10-CM

## 2023-07-01 DIAGNOSIS — H72.92 PERFORATION OF LEFT TYMPANIC MEMBRANE: ICD-10-CM

## 2023-07-01 LAB
ANION GAP SERPL CALCULATED.3IONS-SCNC: 4 MMOL/L
BASOPHILS # BLD AUTO: 0.04 THOUSANDS/ÂΜL (ref 0–0.1)
BASOPHILS NFR BLD AUTO: 1 % (ref 0–1)
BUN SERPL-MCNC: 13 MG/DL (ref 5–25)
CALCIUM SERPL-MCNC: 8.7 MG/DL (ref 8.3–10.1)
CHLORIDE SERPL-SCNC: 110 MMOL/L (ref 96–108)
CO2 SERPL-SCNC: 25 MMOL/L (ref 21–32)
CREAT SERPL-MCNC: 0.7 MG/DL (ref 0.6–1.3)
EOSINOPHIL # BLD AUTO: 0.1 THOUSAND/ÂΜL (ref 0–0.61)
EOSINOPHIL NFR BLD AUTO: 2 % (ref 0–6)
ERYTHROCYTE [DISTWIDTH] IN BLOOD BY AUTOMATED COUNT: 15.9 % (ref 11.6–15.1)
GFR SERPL CREATININE-BSD FRML MDRD: 119 ML/MIN/1.73SQ M
GLUCOSE P FAST SERPL-MCNC: 72 MG/DL (ref 65–99)
HCT VFR BLD AUTO: 36.2 % (ref 34.8–46.1)
HGB BLD-MCNC: 10.7 G/DL (ref 11.5–15.4)
IMM GRANULOCYTES # BLD AUTO: 0.01 THOUSAND/UL (ref 0–0.2)
IMM GRANULOCYTES NFR BLD AUTO: 0 % (ref 0–2)
LYMPHOCYTES # BLD AUTO: 1.36 THOUSANDS/ÂΜL (ref 0.6–4.47)
LYMPHOCYTES NFR BLD AUTO: 27 % (ref 14–44)
MCH RBC QN AUTO: 25.8 PG (ref 26.8–34.3)
MCHC RBC AUTO-ENTMCNC: 29.6 G/DL (ref 31.4–37.4)
MCV RBC AUTO: 87 FL (ref 82–98)
MONOCYTES # BLD AUTO: 0.57 THOUSAND/ÂΜL (ref 0.17–1.22)
MONOCYTES NFR BLD AUTO: 12 % (ref 4–12)
NEUTROPHILS # BLD AUTO: 2.88 THOUSANDS/ÂΜL (ref 1.85–7.62)
NEUTS SEG NFR BLD AUTO: 58 % (ref 43–75)
NRBC BLD AUTO-RTO: 0 /100 WBCS
PLATELET # BLD AUTO: 336 THOUSANDS/UL (ref 149–390)
PMV BLD AUTO: 10.4 FL (ref 8.9–12.7)
POTASSIUM SERPL-SCNC: 4.2 MMOL/L (ref 3.5–5.3)
RBC # BLD AUTO: 4.14 MILLION/UL (ref 3.81–5.12)
SODIUM SERPL-SCNC: 139 MMOL/L (ref 135–147)
WBC # BLD AUTO: 4.96 THOUSAND/UL (ref 4.31–10.16)

## 2023-07-01 PROCEDURE — 85025 COMPLETE CBC W/AUTO DIFF WBC: CPT

## 2023-07-01 PROCEDURE — 36415 COLL VENOUS BLD VENIPUNCTURE: CPT

## 2023-07-01 PROCEDURE — 80048 BASIC METABOLIC PNL TOTAL CA: CPT

## 2023-07-05 NOTE — PRE-PROCEDURE INSTRUCTIONS
Chronic Disease Management Services   video  Clinic Progress Note    This visit is being performed via video to discuss Diabetes and Video Visit    Clinician Location: [facility] [place] [address] Tan Trujillo is in [place] and her identity has been established.   She was informed that consent to treat includes permission to submit charges to the applicable insurance on file. Cassandra was advised regarding the potential risk inherent in video visits, as the assessment may be limited due to what can be seen on the screen which potentially results in an incomplete assessment; as well as either of us may discontinue the video visit if it is felt that the videoconferencing connections are not adequate for his/her situation.   11-20 minutes were spent in this encounter.    Cassandra Sandoval is a 31 year old year old Unknown  White female presenting for a follow-up visit for Chronic Disease management of DM. Patient’s referring provider is Sloane Ott CNP, last seen 5/20/20. Patient’s primary care provider is [facility].    HPI:  General Symptoms: none    DM Symptoms: Polyuria    · Type 1 diagnosed at age 15  · Currently taking novolog with omnipod and on dexcom  Insulin pump settings   Basal  0000 0.75 units/hour  0200 0.9 units/hour  0700 1 units/hour   Carb Ratio  0000 1:11   1030 1:12   1700 1:13 --> 1:12  ISF 1:50  BG target 110  · AIT=3.5 hours  · Aspirin: not taking  · Statin: not taking  · ACEI/ARB: not taking  · A1C 7.7 --> 7.5  · SMBG   · Checking >4 times a day  · Reviewed dexcom report 8/11/20-8/24/20  · 36% in range  · 1% low   · Trying to count carbs more accurately  · Diet  · Breakfast 7am  · Lunch 2-3 pm  · Dinner 7-9 pm  · Eating little less due to cold x 1.5 week and sometimes skips lunch   · Exercise: walk dog daily  · Family planning and trying for baby  · Stress from finishing last week of school    Social History     Tobacco Use   • Smoking status: Never Smoker   • Smokeless tobacco:  No outpatient medications have been marked as taking for the [facility]). Medication instructions for day surgery reviewed. Please use only a sip of water to take your instructed medications. Avoid all over the counter vitamins, supplements and NSAIDS for one week prior to surgery per anesthesia guidelines. Tylenol is ok to take as needed. You will receive a call one business day prior to surgery with an arrival time and hospital directions. If your surgery is scheduled on a Monday, the hospital will be calling you on the Friday prior to your surgery. If you have not heard from anyone by 8pm, please call the hospital supervisor through the hospital  at [phone]. Kiet Rucker [phone]). Do not eat or drink anything after midnight the night before your surgery, including candy, mints, lifesavers, or chewing gum. Do not drink alcohol 24hrs before your surgery. Try not to smoke at least 24hrs before your surgery. Follow the pre surgery showering instructions as listed in the [facility] Booklet” or otherwise provided by your surgeon's office. Do not shave the surgical area 24 hours before surgery. Do not apply any lotions, creams, including makeup, cologne, deodorant, or perfumes after showering on the day of your surgery. No contact lenses, eye make-up, or artificial eyelashes. Remove nail polish, including gel polish, and any artificial, gel, or acrylic nails if possible. Remove all jewelry including rings and body piercing jewelry. Wear causal clothing that is easy to take on and off. Consider your type of surgery. Keep any valuables, jewelry, piercings at home. Please bring any specially ordered equipment (sling, braces) if indicated. Arrange for a responsible person to drive you to and from the hospital on the day of your surgery. Visitor Guidelines discussed.      Call the surgeon's office with any new illnesses, exposures, or Never Used   Substance Use Topics   • Alcohol use: Yes     Frequency: Monthly or less     Drinks per session: 1 or 2     Binge frequency: Less than monthly     Comment: 1/month   • Drug use: No       Routine Health Maintenance:  · Dilated eye exam: not up to date  · Foot exam: Last 7/2020 up to date  · UACR: Last 35.3 7/2019 not up to date  · Vaccinations:  · Influenza: Last 10/2019 up to date  · Pneumonia:  · Pneumovax (PPSV23):  not up to date  · Prevnar (PCV13): not up to date    Immunization History   Administered Date(s) Administered   • DPT 05/07/1992, 02/09/1995   • Hep B, Unspecified Formulation 07/26/2012   • Hep B, adult 08/23/2012, 07/31/2015, 11/06/2015, 06/01/2016   • Hepatitis A - Adult 07/26/2012   • Influenza, Unspecified Formulation 01/10/2014   • Influenza, injectable, quadrivalent 11/06/2015   • Influenza, injectable, quadrivalent, preservative-free 10/03/2019   • Influenza, seasonal, injectable, trivalent 10/21/2014   • Polio, Oral 05/07/1992   • Tdap 08/11/2014       Current Medication List:   Current Outpatient Medications   Medication Sig Dispense Refill   • insulin aspart (NovoLOG) 100 UNIT/ML injectable solution INJECT UP  UNITS VIA INSULIN PUMP EVERY DAY 90 mL 3   • levothyroxine (SYNTHROID, LEVOTHROID) 150 MCG tablet Take 1 tablet by mouth daily. 90 tablet 3   • BAQSIMI TWO PACK 3 MG/DOSE Powder Call 911.  Place 1 spray in 1 nostril.  If no response in 15 minutes, place 1 more spray in nostril with new device. 1 each 3   • Continuous Blood Gluc Sensor (DEXCOM G6 SENSOR) Misc 1 Units continuous. Change every 10 days 9 each 3   • Continuous Blood Gluc  (DEXCOM G6 ) Device 1 Units continuous. 1 Device 0   • Continuous Blood Gluc Transmit (DEXCOM G6 TRANSMITTER) Misc 1 Units continuous. Change every 3 months 3 each 3   • Alcohol Swabs (ALCOHOL PADS) 70 % Pads USE 1 PADS DAILY FOR MONITORING GLUCOSE LEVELS     • Lancets (ACCU-CHEK SAFE-T PRO) Misc 4 times daily. 120 each 0  additional questions prior to surgery. Please reference your [facility] Surgical [facility] Booklet” for additional information to prepare for your upcoming surgery.     No current facility-administered medications for this visit.        ALLERGIES:   Allergen Reactions   • Penicillins Other (See Comments) and HIVES     Unknown   • Penicillins Other (See Comments)     unknown       Vitals:LMP 07/19/2020   BP Readings from Last 1 Encounters:   07/13/20 121/82     Pulse Readings from Last 1 Encounters:   07/13/20 86     Wt Readings from Last 1 Encounters:   07/13/20 93.9 kg       Labs:   No results found for: HGBA1C  Glucose (mg/dL)   Date Value   07/13/2020 208 (H)     Creatinine (mg/dL)   Date Value   07/13/2020 0.52     MICROALBUMIN/CREATININE (mg/g)   Date Value   07/01/2019 35.3 (H)     GFR Estimate,  (no units)   Date Value   07/13/2020 >90     GFR Estimate, Non  (no units)   Date Value   07/13/2020 >90       10-year ASCVD Risk = The ASCVD Risk score (Maddison COVARRUBIAS Jr., et al., 2013) failed to calculate for the following reasons:    The 2013 ASCVD risk score is only valid for ages 40 to 79   CHOLESTEROL (mg/dL)   Date Value   07/13/2020 186     CALCULATED LDL (mg/dL)   Date Value   07/13/2020 99        Assessment/Plan:    ADDENDUM     DM  Goals: A1c <7%, FPG  mg/dL, 2hPPG <180 mg/dL   · SMBG at goal   Insulin pump settings - changes in bold  Basal  0000 0.75 units/hour  0200 0.9 units/hour  0700 1 units/hour   Carb Ratio  0000 1:11   1030 1:12   1700 1:13 --> 1:12  ISF 1:50  BG target 110  · AIT=3.5 hours  · Monitor SMBG each visit  · Repeat Microalbumin ordered  · Counseling provided at this visit: A1c and SMBG goals, discussed pregnancy A1c goal <6.5%  · Pt was provided with an updated medication list with all current medications and instructions on how and what time to take.   · Follow-up: 1 month     Time Spent on Visit:  15 minutes were spent via video  discussion related to the medical management of the patient.    Sierra Vazquez, PharmD, BCPS, BCACP

## 2023-07-12 ENCOUNTER — ANESTHESIA EVENT (OUTPATIENT)
Dept: PERIOP | Facility: HOSPITAL | Age: 28
End: 2023-07-12
Payer: COMMERCIAL

## 2023-07-13 ENCOUNTER — ANESTHESIA (OUTPATIENT)
Dept: PERIOP | Facility: HOSPITAL | Age: 28
End: 2023-07-13
Payer: COMMERCIAL

## 2023-07-13 ENCOUNTER — HOSPITAL ENCOUNTER (OUTPATIENT)
Facility: HOSPITAL | Age: 28
Setting detail: OUTPATIENT SURGERY
Discharge: HOME | End: 2023-07-13
Attending: OTOLARYNGOLOGY | Admitting: OTOLARYNGOLOGY
Payer: COMMERCIAL

## 2023-07-13 VITALS
SYSTOLIC BLOOD PRESSURE: 135 MMHG | BODY MASS INDEX: 39.09 KG/M2 | WEIGHT: 229 LBS | OXYGEN SATURATION: 98 % | HEIGHT: 64 IN | HEART RATE: 102 BPM | RESPIRATION RATE: 18 BRPM | TEMPERATURE: 97.7 F | DIASTOLIC BLOOD PRESSURE: 62 MMHG

## 2023-07-13 DIAGNOSIS — R11.2 PONV (POSTOPERATIVE NAUSEA AND VOMITING): Primary | ICD-10-CM

## 2023-07-13 DIAGNOSIS — Z98.890 PONV (POSTOPERATIVE NAUSEA AND VOMITING): Primary | ICD-10-CM

## 2023-07-13 DIAGNOSIS — H66.22 CHRONIC ATTICOANTRAL SUPPURATIVE OTITIS MEDIA OF LEFT EAR: ICD-10-CM

## 2023-07-13 LAB
EXT PREGNANCY TEST URINE: NEGATIVE
EXT. CONTROL: NORMAL

## 2023-07-13 PROCEDURE — L8613 OSSICULAR IMPLANT: HCPCS | Performed by: OTOLARYNGOLOGY

## 2023-07-13 PROCEDURE — 81025 URINE PREGNANCY TEST: CPT | Performed by: OTOLARYNGOLOGY

## 2023-07-13 PROCEDURE — 69424 REMOVE VENTILATING TUBE: CPT | Performed by: OTOLARYNGOLOGY

## 2023-07-13 PROCEDURE — 15769 GRFG AUTOL SOFT TISS DIR EXC: CPT | Performed by: OTOLARYNGOLOGY

## 2023-07-13 PROCEDURE — 69646 REVISE MIDDLE EAR & MASTOID: CPT | Performed by: OTOLARYNGOLOGY

## 2023-07-13 DEVICE — OFFSET ALTO TOTAL SIZERS INCLUDED TITANIUM/SILICONE
Type: IMPLANTABLE DEVICE | Site: EAR | Status: FUNCTIONAL
Brand: OFFSET ALTO TOTAL

## 2023-07-13 RX ORDER — ACETAMINOPHEN 325 MG/1
650 TABLET ORAL EVERY 6 HOURS PRN
Status: DISCONTINUED | OUTPATIENT
Start: 2023-07-13 | End: 2023-07-13 | Stop reason: HOSPADM

## 2023-07-13 RX ORDER — PROPOFOL 10 MG/ML
INJECTION, EMULSION INTRAVENOUS CONTINUOUS PRN
Status: DISCONTINUED | OUTPATIENT
Start: 2023-07-13 | End: 2023-07-13

## 2023-07-13 RX ORDER — DIPHENHYDRAMINE HYDROCHLORIDE 50 MG/ML
INJECTION INTRAMUSCULAR; INTRAVENOUS AS NEEDED
Status: DISCONTINUED | OUTPATIENT
Start: 2023-07-13 | End: 2023-07-13

## 2023-07-13 RX ORDER — SUCCINYLCHOLINE/SOD CL,ISO/PF 100 MG/5ML
SYRINGE (ML) INTRAVENOUS AS NEEDED
Status: DISCONTINUED | OUTPATIENT
Start: 2023-07-13 | End: 2023-07-13

## 2023-07-13 RX ORDER — SODIUM CHLORIDE, SODIUM LACTATE, POTASSIUM CHLORIDE, CALCIUM CHLORIDE 600; 310; 30; 20 MG/100ML; MG/100ML; MG/100ML; MG/100ML
INJECTION, SOLUTION INTRAVENOUS CONTINUOUS PRN
Status: DISCONTINUED | OUTPATIENT
Start: 2023-07-13 | End: 2023-07-13

## 2023-07-13 RX ORDER — ONDANSETRON 4 MG/1
4 TABLET, FILM COATED ORAL EVERY 8 HOURS PRN
Qty: 20 TABLET | Refills: 0 | Status: SHIPPED | OUTPATIENT
Start: 2023-07-13

## 2023-07-13 RX ORDER — CLINDAMYCIN PHOSPHATE 600 MG/50ML
600 INJECTION INTRAVENOUS ONCE
Status: COMPLETED | OUTPATIENT
Start: 2023-07-13 | End: 2023-07-13

## 2023-07-13 RX ORDER — IBUPROFEN 200 MG
200 TABLET ORAL EVERY 6 HOURS PRN
Status: DISCONTINUED | OUTPATIENT
Start: 2023-07-13 | End: 2023-07-13 | Stop reason: HOSPADM

## 2023-07-13 RX ORDER — FENTANYL CITRATE/PF 50 MCG/ML
25 SYRINGE (ML) INJECTION
Status: DISCONTINUED | OUTPATIENT
Start: 2023-07-13 | End: 2023-07-13 | Stop reason: HOSPADM

## 2023-07-13 RX ORDER — DEXAMETHASONE SODIUM PHOSPHATE 10 MG/ML
INJECTION, SOLUTION INTRAMUSCULAR; INTRAVENOUS AS NEEDED
Status: DISCONTINUED | OUTPATIENT
Start: 2023-07-13 | End: 2023-07-13

## 2023-07-13 RX ORDER — CLINDAMYCIN HYDROCHLORIDE 300 MG/1
300 CAPSULE ORAL 4 TIMES DAILY
Qty: 28 CAPSULE | Refills: 0 | Status: SHIPPED | OUTPATIENT
Start: 2023-07-13 | End: 2023-07-20

## 2023-07-13 RX ORDER — ONDANSETRON 2 MG/ML
4 INJECTION INTRAMUSCULAR; INTRAVENOUS EVERY 6 HOURS PRN
Status: DISCONTINUED | OUTPATIENT
Start: 2023-07-13 | End: 2023-07-13 | Stop reason: HOSPADM

## 2023-07-13 RX ORDER — EPHEDRINE SULFATE 50 MG/ML
INJECTION INTRAVENOUS AS NEEDED
Status: DISCONTINUED | OUTPATIENT
Start: 2023-07-13 | End: 2023-07-13

## 2023-07-13 RX ORDER — SODIUM CHLORIDE, SODIUM LACTATE, POTASSIUM CHLORIDE, CALCIUM CHLORIDE 600; 310; 30; 20 MG/100ML; MG/100ML; MG/100ML; MG/100ML
75 INJECTION, SOLUTION INTRAVENOUS CONTINUOUS
Status: DISCONTINUED | OUTPATIENT
Start: 2023-07-13 | End: 2023-07-13 | Stop reason: HOSPADM

## 2023-07-13 RX ORDER — ONDANSETRON 2 MG/ML
INJECTION INTRAMUSCULAR; INTRAVENOUS AS NEEDED
Status: DISCONTINUED | OUTPATIENT
Start: 2023-07-13 | End: 2023-07-13

## 2023-07-13 RX ORDER — FENTANYL CITRATE 50 UG/ML
INJECTION, SOLUTION INTRAMUSCULAR; INTRAVENOUS AS NEEDED
Status: DISCONTINUED | OUTPATIENT
Start: 2023-07-13 | End: 2023-07-13

## 2023-07-13 RX ORDER — HYDROMORPHONE HCL/PF 1 MG/ML
0.5 SYRINGE (ML) INJECTION
Status: DISCONTINUED | OUTPATIENT
Start: 2023-07-13 | End: 2023-07-13 | Stop reason: HOSPADM

## 2023-07-13 RX ORDER — ONDANSETRON 2 MG/ML
4 INJECTION INTRAMUSCULAR; INTRAVENOUS ONCE AS NEEDED
Status: DISCONTINUED | OUTPATIENT
Start: 2023-07-13 | End: 2023-07-13 | Stop reason: HOSPADM

## 2023-07-13 RX ORDER — OFLOXACIN 3 MG/ML
SOLUTION/ DROPS OPHTHALMIC AS NEEDED
Status: DISCONTINUED | OUTPATIENT
Start: 2023-07-13 | End: 2023-07-13 | Stop reason: HOSPADM

## 2023-07-13 RX ORDER — MIDAZOLAM HYDROCHLORIDE 2 MG/2ML
INJECTION, SOLUTION INTRAMUSCULAR; INTRAVENOUS AS NEEDED
Status: DISCONTINUED | OUTPATIENT
Start: 2023-07-13 | End: 2023-07-13

## 2023-07-13 RX ORDER — PROPOFOL 10 MG/ML
INJECTION, EMULSION INTRAVENOUS AS NEEDED
Status: DISCONTINUED | OUTPATIENT
Start: 2023-07-13 | End: 2023-07-13

## 2023-07-13 RX ORDER — EPINEPHRINE 1 MG/ML
INJECTION, SOLUTION, CONCENTRATE INTRAVENOUS AS NEEDED
Status: DISCONTINUED | OUTPATIENT
Start: 2023-07-13 | End: 2023-07-13 | Stop reason: HOSPADM

## 2023-07-13 RX ORDER — LIDOCAINE HYDROCHLORIDE 20 MG/ML
INJECTION, SOLUTION EPIDURAL; INFILTRATION; INTRACAUDAL; PERINEURAL AS NEEDED
Status: DISCONTINUED | OUTPATIENT
Start: 2023-07-13 | End: 2023-07-13

## 2023-07-13 RX ADMIN — CLINDAMYCIN PHOSPHATE 600 MG: 600 INJECTION, SOLUTION INTRAVENOUS at 08:15

## 2023-07-13 RX ADMIN — EPHEDRINE SULFATE 10 MG: 50 INJECTION, SOLUTION INTRAVENOUS at 08:26

## 2023-07-13 RX ADMIN — DEXAMETHASONE SODIUM PHOSPHATE 10 MG: 10 INJECTION, SOLUTION INTRAMUSCULAR; INTRAVENOUS at 08:20

## 2023-07-13 RX ADMIN — FENTANYL CITRATE 25 MCG: 50 INJECTION INTRAMUSCULAR; INTRAVENOUS at 12:52

## 2023-07-13 RX ADMIN — LIDOCAINE HYDROCHLORIDE 100 MG: 20 INJECTION, SOLUTION EPIDURAL; INFILTRATION; INTRACAUDAL; PERINEURAL at 08:01

## 2023-07-13 RX ADMIN — DIPHENHYDRAMINE HYDROCHLORIDE 12.5 MG: 50 INJECTION, SOLUTION INTRAMUSCULAR; INTRAVENOUS at 09:21

## 2023-07-13 RX ADMIN — SODIUM CHLORIDE 0.2 MCG/KG/MIN: 900 INJECTION INTRAVENOUS at 08:04

## 2023-07-13 RX ADMIN — PROPOFOL 150 MCG/KG/MIN: 10 INJECTION, EMULSION INTRAVENOUS at 08:04

## 2023-07-13 RX ADMIN — PROPOFOL 200 MG: 10 INJECTION, EMULSION INTRAVENOUS at 08:01

## 2023-07-13 RX ADMIN — Medication 100 MG: at 08:02

## 2023-07-13 RX ADMIN — EPHEDRINE SULFATE 10 MG: 50 INJECTION, SOLUTION INTRAVENOUS at 09:18

## 2023-07-13 RX ADMIN — SODIUM CHLORIDE, SODIUM LACTATE, POTASSIUM CHLORIDE, AND CALCIUM CHLORIDE: .6; .31; .03; .02 INJECTION, SOLUTION INTRAVENOUS at 07:56

## 2023-07-13 RX ADMIN — ONDANSETRON 4 MG: 2 INJECTION INTRAMUSCULAR; INTRAVENOUS at 11:44

## 2023-07-13 RX ADMIN — MIDAZOLAM 2 MG: 1 INJECTION INTRAMUSCULAR; INTRAVENOUS at 07:55

## 2023-07-13 RX ADMIN — FENTANYL CITRATE 25 MCG: 50 INJECTION INTRAMUSCULAR; INTRAVENOUS at 11:44

## 2023-07-13 NOTE — DISCHARGE INSTR - AVS FIRST PAGE
Discharge to home    Remove dressing tomorrow morning  Keep ear dry  OK to wash hair in 3 days, keep ear dry  No lifting > *** lb for 3 weeks  Continue home medications  Tylenol 650 mg PO Q6 hours as needed for pain  Ibuprofen 200 mg PO q4 hour as needed for pain  Zofran 4 mg PO q6 hours as needed for nausea  Clindamycin 300 mg PO four times a day  Diet as tolerated, ok to eat regular foods  Follow up in 1 week with Dr. Jonny Acevedo

## 2023-07-13 NOTE — OP NOTE
OPERATIVE REPORT  PATIENT NAME: Alexi Bazan    :  1995  MRN: [id number]  Pt Location:  OR ROOM 06    SURGERY DATE: 2023    Surgeon(s) and Role:     * Lei Dhillon MD - Primary    Preop Diagnosis:  Retained myringotomy tube in right ear [Z96.22]  Perforation of left tympanic membrane [H72.92]  Left chronic otitis media [H66.92]    Post-Op Diagnosis Codes:     * Retained myringotomy tube in right ear [Z96.22]     * Perforation of left tympanic membrane [H72.92]     * Left chronic otitis media [H66.92]    Procedure(s):  Left - LEFT REVISION CANAL WALL DOWN (MODIFIED RADICAL) TYMPANOMASTOIDECTOMY WITH  OSSICULOPLASTY LEFT FACIAL NERVE MONITORING  RIGHT REMOVAL FOREIGN BODY/ [facility]. Specimen(s):  * No specimens in log * none    Estimated Blood Loss:   Minimal    Drains:  * No LDAs found * none    Anesthesia Type:   General    Operative Indications:  Retained myringotomy tube in right ear [Z96.22]  Perforation of left tympanic membrane [H72.92]  Left chronic otitis media [H66.92]  Right retained ear tube    Operative Findings:  Contracted mastoid with overhanging tegmen  Granulation tissue and unopened air cells in the left mastoid cavity  Two areas of tegmen dehiscence in the left mastoid cavity, no evidence of CSF leak  Proximal ear canal stenosis  Granulation tissue in the middle ear  Eustachian tube occlusion  Absent malleus, incus, and stapes superstructure  Intact and mobile stapes footplate  Anterior inferior 20% left tympanic membrane perforation with bobbin tube in place    Complications:   None    Procedure and Technique:    The patient was brought into the operating room and placed supine on the OR table. Following the induction of general anesthesia, an endotracheal tube was placed by the anesthesia staff. The bed was turned 180 degrees. A cotton ball was placed in the left ear.  A 5 cm C-shaped incision was marked out  behind the left ear along the prior postauricular incision and injected with 1% lidocaine, 1:100,000 epinephrine. A facial nerve monitor was placed on the left face was found to be functional. The patient was then prepped and draped in sterile fashion. The marked postauricular incision was made through the skin, and the skin flap was elevated anteriorly. A superiorly based Palva flap was elevated. A piece of temporalis fascia was harvested and saved on the back table for later use." The operating microscope was steriley draped, brought into the field, and used for the remainder of the procedure. Next, the external auditory canal was inspected using the operating microscope. The following findings were noted: the meatoplasty was slightly inadequate. There was lateralization of the tympanic membrane with a perforation superiorly. The ear canal was injected in 4 quadrants with a 1:100,000 dilution of epinephrine in sterile injectable saline. The conchal bowl was also injected with diluted epinephrine. A  revision canal wall down mastoidectomy was then performed using the high-speed otologic drill and microdissection techniques. During this portion of the procedure, the following landmarks were identified and preserved: the posterior bony canal wall, the sigmoid sinus, the tegmen mastoideum, the horizontal semicircular canal. The following findings were identified: the mastoid bowl was contracted. There were two areas of bone missing over the tegmen mastoideum, but no evidence of CSF otorrhea or dural defect. There was granulation tissue in the mastoid. There were undrilled air cells within the mastoid and new bone overgrowth around the edges of the mastoid. The granulation tissue was removed in its entirety    From posteriorly, the skin surrounding the external auditory canal was elevated superiorly and inferiorly. Next, the skin of the external auditory canal was elevated medially.  Incisions for a Sanjeev's flap were made, and the skin was elevated, allowing full visualization of the tympanic membrane and medial portion of the external auditory canal.      Next the meatoplasty was performed. Incisions were made at 12 and 6 o'clock through the skin of the external auditory canal laterally. These incisions were carried laterally into the conchal bowl immediately beneath the antihelix and immediately superior to the helical rim inferiorly, and were taken through the underlying soft tissues and cartilage of the conchal bowl. The skin flap was dissected off the underlying cartilage and the cartilage of that area of the conchal bowl was removed. The underlying soft tissue was also opened to allow for an adequate meatoplasty. The unopened air cells were opened and the mastoid bowl edges were saucerized. The facial ridge was high and so the bon overlying the facial nerve was thinned down using a high speed otologic drill and microdissection techniques. The tympanoplasty was then performed as follows. The tympanomeatal flap was elevated using a duckbill elevator. There was granulation tissue beneath the tympanic membrane in the posterior superior and posterior inferior quadrants. The chorda tympani was absent. The ossicles were not intact. The malleus, incus and stapes superstructure were absent. There was granulation tissue within the middle ear overlying the oval and round windows, which was removed with dissection. The stapes footplate was intact and mobile. The facial nerve was bony covered in the tympanic segment. The granulation tissue was removed in its entirety. The tensor tympani tendon was also removed. There was granulation tissue blocking the Eustachian tube orifice, which was removed with blunt dissection. The ossiculoplasty was then performed as follows: A piece of cartilage was harvested from the excised cartilage from the meatoplasty. The cartilage graft was trimmed to the size necessary to cover the lateral aspect of the prosthesis.  Using TORP sizers, the distance between the tympanic membrane and tragal cartilage and the stapes footplate was measured at 2.5 mm. [address] was then trimmed to the appropriate length and put into position on the stapes footplate. It was held into position with Floxin-soaked gelfoam.     The tympanoplasty was then completed. The abnormal portions of the lateralized skin were excised. Floxin-soaked gelfoam was placed into the middle ear cavity. The temporalis fascia was placed in an underlay fashion. The tympanomeatal flap and temporalis fascia graft were then put back into position. Floxin-soaked gelfoam was then placed over the tympanomeatal flap and temporalis fascia graft . The Sanjeev's flap was then put back into position. The Palva flap was sutured back into position with interrupted sutures of 3-0 vicryl. The mastoid bowl was packed with bacitracin-soaked 1/4 strip packing gauze. A bacitracin-soaked Telfa plug was then placed into the meatoplasty. The postauricular incision was closed in layers. Tissue glue adhesive was then placed over the incision line. The sterile drapes were removed from the head. A Summer dressing was placed. The drapes were removed from the head. The patients head was then turned to the left. The right ear was examined using the binocular microscope. There was a myringotomy tube present within an anterior inferior perforation. The tube was removed. The perforation measured 20% of the anterior inferior tympanic membrane and was dry. The tympanic membrane was thickened. There was no evidence of cholesteatoma. She was then awoken from general anesthesia and taken to the PACU in stable condition. Her postoperative facial function was I/VI on the House-Brackmann scale bilaterally. The patient tolerated this procedure well without complications.      Dr. Edward Alvarez was present throughout this procedure and performed all key portions   I was present for the entire procedure.     Patient Disposition:  PACU  and extubated and stable        SIGNATURE: William Berger MD  DATE: July 13, 2023  TIME: 11:49 AM

## 2023-07-13 NOTE — H&P
Alexi Bazan is a 24 y.o. who presents with a chief complaint of       Chief Complaint   Patient presents with   • Ear Problem         Referred by Aishwarya Moseley     Pertinent elements of the history include:  24year old woman with history of left canal wall down mastoidectomy at age 10 and right myringotomy and tube placement 6 years ago with complaints of left chronic otorrhea referred for possible otologic surgery. She reports being treated with CiproDex drops for over a year without resolution of her otorrhea. She was seen recently in the ER and was given a prescription for cefdinir which she is completing in a day and states that since start cefdinir her otorrhea has resolved. Her hearing is worse in the left ear than the right since her canal wall down surgery and wore a hearing aid in that ear postoperatively but did not like the sound so no longer wears a hearing aid. She has episodic spinning vertigo and pulsatile tinnitus in the left ear.     Review of systems Review of systems reviewed, as documented on a separate form. All other systems reviewed, are negative.  Dr. Bertie Castleman has reviewed these with the patient.     The past medical, surgical, social and family history have been reviewed as documented in today's record.      has a past medical history of Ear disease, Hearing loss, and Insomnia.     Surgical History         Past Surgical History:   Procedure Laterality Date   • EAR SURGERY Left       Foreign body removed    • TONSILECTOMY AND ADNOIDECTOMY       • TYMPANOSTOMY TUBE PLACEMENT Right              Family History         Family History   Problem Relation Age of Onset   • Allergies Mother     • Diabetes type II Paternal Grandfather              Social History               Socioeconomic History   • Marital status: /Civil Union       Spouse name: Not on file   • Number of children: Not on file   • Years of education: Not on file   • Highest education level: Not on file   Occupational History Pharmacy Dosing Services: Renal 
 
Pharmacist Renal Dosing Progress Note for Pepcid Physician Dr. Saurav Cason The following medication: Pepcid was automatically dose-adjusted per THE [facility] P&T Committee Protocol, with respect to renal function. Consult provided for this   82 y.o. , male , for the indication of GERD. Pt Weight:  
Wt Readings from Last 1 Encounters:  
11/27/18 87.5 kg (193 lb) Previous Regimen Pepcid 20mg po BID Serum Creatinine Lab Results Component Value Date/Time Creatinine 1.69 (H) 11/27/2018 11:40 AM  
   
Creatinine Clearance Estimated Creatinine Clearance: 36.9 mL/min (A) (based on SCr of 1.69 mg/dL (H)). BUN Lab Results Component Value Date/Time BUN 25 (H) 11/27/2018 11:40 AM  
   
Dosage changed to: Pepcid 20mg po daily Pharmacy to continue to monitor patient daily. Will make dosage adjustments based upon changing renal function. Clemente Benson, PHARMD. Contact information: [phone] • Not on file   Tobacco Use   • Smoking status: Former       Years: 3.00       Types: Cigarettes       Quit date: 2013       Years since quittin.9   • Smokeless tobacco: Never   Vaping Use   • Vaping Use: Never used   Substance and Sexual Activity   • Alcohol use:  Yes       Comment: Rare   • Drug use: No   • Sexual activity: Not on file   Other Topics Concern   • Not on file   Social History Narrative     Living with single parent     No living will       Social Determinants of Health      Financial Resource Strain: Not on file   Food Insecurity: Not on file   Transportation Needs: Not on file   Physical Activity: Not on file   Stress: Not on file   Social Connections: Not on file   Intimate Partner Violence: Not on file   Housing Stability: Not on file                   Current Outpatient Medications on File Prior to Visit   Medication Sig Dispense Refill   • meclizine (ANTIVERT) 25 mg tablet Take 1 tablet (25 mg total) by mouth every 8 (eight) hours as needed for dizziness (Patient not taking: Reported on 11/15/2022) 12 tablet 0      No current facility-administered medications on file prior to visit.         Physical exam:   [facility] 2022 (Approximate)      Constitutional:  Well developed, well nourished and groomed, in no acute distress.      Eyes:  Extra-ocular movements intact, pupils equally round and reactive to light and accommodation, the lids and conjunctivae are normal in appearance.     Head: Atraumatic, normocephalic, no visible scalp lesions, bony palpation unremarkable without stepoffs, parotid and submandibular salivary glands non-tender to palpation and without masses bilaterally.     Ears:    Right ear: Auricle normal in appearance, mastoid prominence non-tender and external auditory canal clear, TM with dry anterior inferior 10-15% perforation with bobbin tube in place in the perforation (perforation is wider than tube)     Left ear: pinna with adequate meatoplasty, clear mastoid bowl, TM with 20% perforation anteriorly over the Eustachian tube orificet. No otorrhea present     Tuning forks:     Conklin 512 Hz left  Rinne 512 Hz:              right air > bone              left air > bone  CNVII I              Right: I/VI              Left: I/VI     Nose: anterior rhinoscopy shows a patent nasal airway without masses, lesions or polyps     Mouth/oral cavity: no masses and palate elevates symmetrically  TMJs nontender to palpation     Dentition: intact     Oropharynx:  Base of tongue soft and without masses, tonsils bilaterally unremarkable, soft palate mucosa unremarkable.      Neck:  No visible or palpable cervical lesions or lymphadenopathy, thyroid gland is normal in size and symmetry and without masses, normal laryngeal elevation with swallowing.     Respiratory:  Normal respiratory effort without evidence of retractions or use of accessory muscles.     Integument:  Normal appearing without observed masses or lesions.     Neurologic:  Cranial nerves II-XII intact bilaterally. No spontaneous or gaze evoked nystagmus. Gait steady.     Psychiatric:  Alert and oriented to time, place and person, normal affect.     Chest: clear to auscultation    Cardiac: regular rate and rhythm    Abdomen: nondistended soft nontender    Extremities: no CCE     Results reviewed; images from any scan have been personally reviewed:     Audiogram: 7/20/2022  Right ear: moderate improving to normal mixed hearing loss  SRT 30 dB %  Left ear: moderately severe improving to moderate mixed hearing loss SRT 50 dB %  Tracings reviewed by Dr. Kory Pickens, who agrees with the impression as noted above.     Tympanogram: 7/20/2022  Right ear: type B large volume  Left ear: type B large volume  Tracings reviewed by Dr. Kory Pickens, who agrees with the impression as noted above.     IMAGING:     CT temporal bones without contrast 10/19/2022  Right ear: normal mastoid, middle ear, ossicular chain, inner ear, thickened TM with PE tube in place, normal external auditory canal  Left ear: prior left canal wall down mastoid cavity with fluid versus soft tissue medially, thickened tympanic membrane, absent ossicular chain, possible soft tissue over oval window, adequate appearing meatoplasty  Dr. Laura Ji has reviewed the images and agrees with the impression as noted above.     Procedures  None     Assessment/Plan:      1. Mixed conductive and sensorineural hearing loss of both ears     2. H/O mastoidectomy     3. History of cholesteatoma     4. Pars flaccida tympanic membrane perforation, left     5. Perforation of right tympanic membrane     6. Retained myringotomy tube in right ear           Her audiogram, tympanogram, CT temporal bones, and physical examination findings were discussed in detail with the patient  Management options including left revision canal wall down tympanomastoidectomy possible ossiculoplasty with facial nerve monitoring and removal retained right PE tube were discussed in detail with the patient.  The patient understands that the risks of the procedure include but are not limited to hearing loss, dizziness, infection, facial nerve injury/paralysis, and need for further surgery and has agreed to proceed.     Followup: 1 week after surgery

## 2023-07-13 NOTE — ANESTHESIA POSTPROCEDURE EVALUATION
Post-Op Assessment Note    CV Status:  Stable  Pain Score: 0    Pain management: adequate  Multimodal analgesia used between 6 hours prior to anesthesia start to PACU discharge    Mental Status:  Alert and awake   Hydration Status:  Euvolemic   PONV Controlled:  Controlled   Airway Patency:  Patent      Post Op Vitals Reviewed: Yes      Staff: CRNA         No notable events documented.     BP   145/75   Temp   98 F   Pulse  99   Resp   19   SpO2   96%

## 2023-07-13 NOTE — ANESTHESIA PREPROCEDURE EVALUATION
Procedure:  LEFT REVISION CANAL WALL DOWN TYMPANOMASTOIDECTOMY, POSSIBLE OSSICULOPLASTY RIGHT REMOVAL FOREIGN BODY/ EARTUBE, LEFT FACIAL NERVE MONITORING (Left: Ear)  OSSICULOPLASTY (Left: Ear)  REMOVAL MYRINGOTOMY TUBES (Right: Ear)    Relevant Problems   No relevant active problems   PONV after last ENT surgery - went home feeling nauseous -age 10  -plan for TIVA  Obesity    Denies smoking, and illicit drug use  NPO appropriate  preg test negative  Not on any vitamins, anticoagulants, antiplatelet agents     Lab Results   Component Value Date    WBC 4.96 07/01/2023    HGB 10.7 (L) 07/01/2023    HCT 36.2 07/01/2023    MCV 87 07/01/2023     07/01/2023     Lab Results   Component Value Date    SODIUM 139 07/01/2023    K 4.2 07/01/2023     (H) 07/01/2023    CO2 25 07/01/2023    BUN 13 07/01/2023    CREATININE 0.70 07/01/2023    GLUC 78 04/04/2021    CALCIUM 8.7 07/01/2023         Physical Exam    Airway    Mallampati score: III  TM Distance: >3 FB  Neck ROM: full     Dental       Cardiovascular  Rhythm: regular, Rate: normal,     Pulmonary  Breath sounds clear to auscultation,     Other Findings        Anesthesia Plan  ASA Score- 3     Anesthesia Type- general with ASA Monitors. Additional Monitors:   Airway Plan: ETT. Comment: Risks/benefits and alternatives discussed with patient including possible PONV, sore throat, damage to teeth/lips/gums/esophagus, and possibility of rare anesthetic and surgical emergencies including but not limited to heart attack, stroke, and/or death. All questions were answered. .       Plan Factors-Exercise tolerance (METS): >4 METS. Chart reviewed. Existing labs reviewed. Patient summary reviewed. Patient is not a current smoker. Obstructive sleep apnea risk education given perioperatively. Induction- intravenous. Postoperative Plan- Plan for postoperative opioid use.  Planned trial extubation    Informed Consent- Anesthetic plan and risks discussed with patient. I personally reviewed this patient with the CRNA. Discussed and agreed on the Anesthesia Plan with the CRNA. Chester Friend

## 2023-11-10 ENCOUNTER — OFFICE VISIT (OUTPATIENT)
Dept: FAMILY MEDICINE CLINIC | Facility: CLINIC | Age: 28
End: 2023-11-10
Payer: COMMERCIAL

## 2023-11-10 VITALS
TEMPERATURE: 97.9 F | HEIGHT: 64 IN | OXYGEN SATURATION: 99 % | SYSTOLIC BLOOD PRESSURE: 112 MMHG | WEIGHT: 232 LBS | DIASTOLIC BLOOD PRESSURE: 78 MMHG | HEART RATE: 79 BPM | BODY MASS INDEX: 39.61 KG/M2

## 2023-11-10 DIAGNOSIS — H55.09 HORIZONTAL NYSTAGMUS: Primary | ICD-10-CM

## 2023-11-10 PROCEDURE — 99213 OFFICE O/P EST LOW 20 MIN: CPT | Performed by: FAMILY MEDICINE

## 2023-11-10 NOTE — PROGRESS NOTES
Depression Screening and Follow-up Plan: Patient was screened for depression during today's encounter. They screened negative with a PHQ-2 score of 0. Assessment/Plan:    Problem List Items Addressed This Visit    None  Visit Diagnoses       Horizontal nystagmus    -  Primary             Diagnoses and all orders for this visit:    Horizontal nystagmus        No problem-specific Assessment & Plan notes found for this encounter. Subjective:      Patient ID: Estela Tolliver is a 34 y.o. female. Patient is experiencing horizontal nystagmus present for 2 months not aware that she had this prior to 2 months ago spoke with her neuro otologist Dr. Kaplan Anis she recommended seeing us and will need to be referred to a neuro-ophthalmologist        The following portions of the patient's history were reviewed and updated as appropriate:   She has a past medical history of Ear disease, Hearing loss, Insomnia, and PONV (postoperative nausea and vomiting). ,  does not have any pertinent problems on file. ,   has a past surgical history that includes Tympanostomy tube placement (Right); TONSILECTOMY AND ADNOIDECTOMY; EAR SURGERY (Left); Mastoidectomy; pr tmpp mastoidect ntc/rcnsted wall w/o ocr (Left, 7/13/2023); pr ventilating tube rmvl requiring general anes (Right, 7/13/2023); and Ossicular reconstruction (Left, 7/13/2023). ,  family history includes Allergies in her mother; Diabetes type II in her paternal grandfather. ,   reports that she quit smoking about 10 years ago. Her smoking use included cigarettes. She has never been exposed to tobacco smoke. She has never used smokeless tobacco. She reports current alcohol use. She reports that she does not use drugs. ,  is allergic to augmentin [amoxicillin-pot clavulanate], bactrim [sulfamethoxazole-trimethoprim], and coconut oil - food allergy. .  No current outpatient medications on file. No current facility-administered medications for this visit.        Review of Systems Constitutional:  Negative for activity change, appetite change, diaphoresis, fatigue and fever. HENT: Negative. Eyes: Negative. Horizontal nystagmus   Respiratory:  Negative for apnea, cough, chest tightness, shortness of breath and wheezing. Cardiovascular:  Negative for chest pain, palpitations and leg swelling. Gastrointestinal:  Negative for abdominal distention, abdominal pain, anal bleeding, constipation, diarrhea, nausea and vomiting. Endocrine: Negative for cold intolerance, heat intolerance, polydipsia, polyphagia and polyuria. Genitourinary:  Negative for difficulty urinating, dysuria, flank pain, hematuria and urgency. Musculoskeletal:  Negative for arthralgias, back pain, gait problem, joint swelling and myalgias. Skin:  Negative for color change, rash and wound. Allergic/Immunologic: Negative for environmental allergies, food allergies and immunocompromised state. Neurological:  Negative for dizziness, seizures, syncope, speech difficulty, numbness and headaches. Hematological:  Negative for adenopathy. Does not bruise/bleed easily. Psychiatric/Behavioral:  Negative for agitation, behavioral problems, hallucinations, sleep disturbance and suicidal ideas. Objective:  Vitals:    11/10/23 1336   BP: 112/78   BP Location: Left arm   Patient Position: Sitting   Cuff Size: Standard   Pulse: 79   Temp: 97.9 °F (36.6 °C)   TempSrc: Temporal   SpO2: 99%   Weight: 105 kg (232 lb)   Height: 5' 4" (1.626 m)     Body mass index is 39.82 kg/m². Physical Exam  Constitutional:       General: She is not in acute distress. Appearance: She is well-developed. She is not diaphoretic. HENT:      Head: Normocephalic. Right Ear: External ear normal.      Left Ear: External ear normal.      Nose: Nose normal.   Eyes:      General: No scleral icterus. Right eye: No discharge. Left eye: No discharge.       Conjunctiva/sclera: Conjunctivae normal. Pupils: Pupils are equal, round, and reactive to light. Comments: Horizontal nystagmus   Neck:      Thyroid: No thyromegaly. Trachea: No tracheal deviation. Cardiovascular:      Rate and Rhythm: Normal rate and regular rhythm. Heart sounds: Normal heart sounds. No murmur heard. No friction rub. No gallop. Pulmonary:      Effort: Pulmonary effort is normal. No respiratory distress. Breath sounds: Normal breath sounds. No wheezing. Abdominal:      General: Bowel sounds are normal.      Palpations: Abdomen is soft. There is no mass. Tenderness: There is no abdominal tenderness. There is no guarding. Musculoskeletal:         General: No deformity. Cervical back: Normal range of motion. Lymphadenopathy:      Cervical: No cervical adenopathy. Skin:     General: Skin is warm and dry. Findings: No erythema or rash. Neurological:      Mental Status: She is alert and oriented to person, place, and time. Cranial Nerves: No cranial nerve deficit. Psychiatric:         Thought Content:  Thought content normal.

## 2024-03-01 PROCEDURE — 87147 CULTURE TYPE IMMUNOLOGIC: CPT | Performed by: PHYSICIAN ASSISTANT

## 2024-03-01 PROCEDURE — 87070 CULTURE OTHR SPECIMN AEROBIC: CPT | Performed by: PHYSICIAN ASSISTANT

## 2024-03-01 PROCEDURE — 87186 SC STD MICRODIL/AGAR DIL: CPT | Performed by: PHYSICIAN ASSISTANT

## 2024-03-01 PROCEDURE — 87205 SMEAR GRAM STAIN: CPT | Performed by: PHYSICIAN ASSISTANT

## 2024-11-08 ENCOUNTER — OFFICE VISIT (OUTPATIENT)
Dept: FAMILY MEDICINE CLINIC | Facility: CLINIC | Age: 29
End: 2024-11-08
Payer: COMMERCIAL

## 2024-11-08 VITALS
HEIGHT: 64 IN | OXYGEN SATURATION: 98 % | HEART RATE: 96 BPM | TEMPERATURE: 98.4 F | SYSTOLIC BLOOD PRESSURE: 128 MMHG | BODY MASS INDEX: 42.17 KG/M2 | DIASTOLIC BLOOD PRESSURE: 82 MMHG | WEIGHT: 247 LBS

## 2024-11-08 DIAGNOSIS — R51.9 NONINTRACTABLE EPISODIC HEADACHE, UNSPECIFIED HEADACHE TYPE: Primary | ICD-10-CM

## 2024-11-08 DIAGNOSIS — H69.90 DYSFUNCTION OF EUSTACHIAN TUBE, UNSPECIFIED LATERALITY: ICD-10-CM

## 2024-11-08 PROCEDURE — 99214 OFFICE O/P EST MOD 30 MIN: CPT | Performed by: FAMILY MEDICINE

## 2024-11-08 RX ORDER — AZITHROMYCIN 250 MG/1
TABLET, FILM COATED ORAL
Qty: 6 TABLET | Refills: 0 | Status: SHIPPED | OUTPATIENT
Start: 2024-11-08 | End: 2024-11-12

## 2024-11-08 RX ORDER — METHYLPREDNISOLONE 4 MG/1
TABLET ORAL
Qty: 21 EACH | Refills: 0 | Status: SHIPPED | OUTPATIENT
Start: 2024-11-08

## 2024-11-08 NOTE — PROGRESS NOTES
"Ambulatory Visit  Name: Josh Berkowitz      : 1995      MRN: [id number]  Encounter Provider: Ernesto Felix DO  Encounter Date: 2024   Encounter department: [facility]    Assessment & Plan  Nonintractable episodic headache, unspecified headache type    Orders:    CBC and differential; Future    methylPREDNISolone 4 MG tablet therapy pack; Use as directed on package      Depression Screening and Follow-up Plan: Patient was screened for depression during today's encounter. They screened negative with a PHQ-2 score of 0.      History of Present Illness     She has had a an oculoplastic the of the bones of the middle ear and a mastoidectomy done by Dr. Rico she is currently not able to follow-up with Dr. Rico because of a billing discrepancy with her health insurance so she came to our office on physical exam her her eardrums on the right side is perforated there is no sign of any infection and the eardrum on the left side has been numerous scars but it is is it is intact and there is also no sign of any infection she describes the headache as a pressure in both years it is not pulsatile it is not constant but is at least a daily episode plan will be to get a CBC to watch her white count and if her white count is normal start a Medrol Dosepak for inflammation.  I did ask the patient to contact Dr. Rico's office to see if they need any imaging    Headache        Review of Systems   HENT:  Positive for hearing loss.    Neurological:  Positive for headaches.           Objective     /82 (BP Location: Left arm, Patient Position: Sitting, Cuff Size: Standard)   Pulse 96   Temp 98.4 °F (36.9 °C) (Temporal)   Ht 5' 4\" (1.626 m)   Wt 112 kg (247 lb)   SpO2 98%   BMI 42.40 kg/m²     Physical Exam  Constitutional:       Appearance: Normal appearance. She is obese.   HENT:      Ears:      Comments: Right tympanic membrane is perforated no sign of infection left tympanic membrane is intact but " scarred no sign of infection     Nose: Congestion present.   Neurological:      Mental Status: She is alert.

## 2025-03-25 ENCOUNTER — OFFICE VISIT (OUTPATIENT)
Dept: FAMILY MEDICINE CLINIC | Facility: CLINIC | Age: 30
End: 2025-03-25
Payer: COMMERCIAL

## 2025-03-25 VITALS
OXYGEN SATURATION: 99 % | TEMPERATURE: 98.6 F | BODY MASS INDEX: 35.68 KG/M2 | HEIGHT: 64 IN | DIASTOLIC BLOOD PRESSURE: 86 MMHG | SYSTOLIC BLOOD PRESSURE: 118 MMHG | HEART RATE: 77 BPM | WEIGHT: 209 LBS

## 2025-03-25 DIAGNOSIS — H65.116 RECURRENT SUBACUTE ALLERGIC OTITIS MEDIA OF BOTH EARS: Primary | ICD-10-CM

## 2025-03-25 PROCEDURE — 99213 OFFICE O/P EST LOW 20 MIN: CPT | Performed by: FAMILY MEDICINE

## 2025-03-25 NOTE — PROGRESS NOTES
"Name: Josh Berkowitz      : 1995      MRN: [id number]  Encounter Provider: Ernesto Felix DO  Encounter Date: 3/25/2025   Encounter department: [facility]  :  Assessment & Plan  Recurrent subacute allergic otitis media of both ears  Cefdinir 300 mg twice daily              History of Present Illness   Is here patient has chronic otitis she has a flare current acute otitis planned treat with cefdinir      Review of Systems   Constitutional:  Positive for activity change.   HENT:  Positive for congestion and ear pain.    Respiratory:  Positive for cough.        Objective   /86 (BP Location: Left arm, Patient Position: Sitting, Cuff Size: Large)   Pulse 77   Temp 98.6 °F (37 °C) (Temporal)   Ht 5' 4\" (1.626 m)   Wt 94.8 kg (209 lb)   SpO2 99%   BMI 35.87 kg/m²      Physical Exam  Constitutional:       Appearance: She is well-developed.   HENT:      Head: Normocephalic and atraumatic.      Right Ear: External ear normal.      Left Ear: External ear normal.      Nose: Nose normal.   Eyes:      Conjunctiva/sclera: Conjunctivae normal.      Pupils: Pupils are equal, round, and reactive to light.   Cardiovascular:      Rate and Rhythm: Normal rate and regular rhythm.      Heart sounds: Normal heart sounds. No murmur heard.     No friction rub.   Pulmonary:      Effort: Pulmonary effort is normal. No respiratory distress.      Breath sounds: Normal breath sounds. No wheezing or rales.   Chest:      Chest wall: No tenderness.   Abdominal:      General: Bowel sounds are normal.      Palpations: Abdomen is soft.   Musculoskeletal:         General: Normal range of motion.      Cervical back: Normal range of motion and neck supple.   Skin:     General: Skin is warm and dry.      Capillary Refill: Capillary refill takes 2 to 3 seconds.   Neurological:      Mental Status: She is alert and oriented to person, place, and time.   Psychiatric:         Behavior: Behavior normal.         Thought Content: " Thought content normal.         Judgment: Judgment normal.

## 2025-04-15 ENCOUNTER — VBI (OUTPATIENT)
Dept: ADMINISTRATIVE | Facility: OTHER | Age: 30
End: 2025-04-15

## 2025-04-15 NOTE — TELEPHONE ENCOUNTER
04/15/25 7:33 AM     Chart reviewed for Pap Smear (HPV) aka Cervical Cancer Screening ; nothing is submitted to the patient's insurance at this time.     Kindra Mcknight   [facility]

## 2025-04-28 ENCOUNTER — TELEPHONE (OUTPATIENT)
Dept: FAMILY MEDICINE CLINIC | Facility: CLINIC | Age: 30
End: 2025-04-28

## 2025-04-28 ENCOUNTER — OFFICE VISIT (OUTPATIENT)
Dept: FAMILY MEDICINE CLINIC | Facility: CLINIC | Age: 30
End: 2025-04-28
Payer: COMMERCIAL

## 2025-04-28 VITALS
HEIGHT: 64 IN | BODY MASS INDEX: 33.12 KG/M2 | WEIGHT: 194 LBS | OXYGEN SATURATION: 99 % | TEMPERATURE: 97.4 F | DIASTOLIC BLOOD PRESSURE: 84 MMHG | HEART RATE: 74 BPM | SYSTOLIC BLOOD PRESSURE: 122 MMHG

## 2025-04-28 DIAGNOSIS — G44.52 NEW DAILY PERSISTENT HEADACHE: Primary | ICD-10-CM

## 2025-04-28 PROCEDURE — 99214 OFFICE O/P EST MOD 30 MIN: CPT | Performed by: FAMILY MEDICINE

## 2025-04-28 NOTE — PROGRESS NOTES
"Name: Josh Berkowitz      : 1995      MRN: [id number]  Encounter Provider: Ernesto Felix DO  Encounter Date: 2025   Encounter department: [facility]  :  Assessment & Plan  New daily persistent headache    Orders:    CTA head and neck w wo contrast; Future    Basic metabolic panel    CBC and differential; Future           History of Present Illness   Patient is here today chief complaint is headache every morning when she gets up it lasts about 1 hour it concentrates in both years and in her sinuses she then has resolution of the headache she has an extensive history of tympanostomy for reconstruction of the middle ear osteotome was in the left ear    Earache   There is pain in both ears. This is a new problem. The current episode started 1 to 4 weeks ago. The problem occurs every few hours. The problem has been unchanged. There has been no fever. The pain is at a severity of 7/10. Associated symptoms include headaches and hearing loss. Pertinent negatives include no abdominal pain, coughing, diarrhea, ear discharge, neck pain, rash, rhinorrhea, sore throat or vomiting.     Review of Systems   HENT:  Positive for ear pain and hearing loss. Negative for ear discharge, rhinorrhea and sore throat.    Respiratory:  Negative for cough.    Gastrointestinal:  Negative for abdominal pain, diarrhea and vomiting.   Musculoskeletal:  Negative for neck pain.   Skin:  Negative for rash.   Neurological:  Positive for headaches.       Objective   /84 (BP Location: Left arm, Patient Position: Sitting, Cuff Size: Large)   Pulse 74   Temp (!) 97.4 °F (36.3 °C) (Temporal)   Ht 5' 4\" (1.626 m)   Wt 88 kg (194 lb)   SpO2 99%   BMI 33.30 kg/m²      Physical Exam  Constitutional:       Appearance: She is well-developed.   HENT:      Head: Normocephalic and atraumatic.      Right Ear: External ear normal.      Left Ear: External ear normal.      Ears:      Comments: Abnormality of both ears with " cholesteatoma which was removed left ear     Nose: Nose normal.   Eyes:      Conjunctiva/sclera: Conjunctivae normal.      Pupils: Pupils are equal, round, and reactive to light.   Cardiovascular:      Rate and Rhythm: Normal rate and regular rhythm.      Heart sounds: Normal heart sounds. No murmur heard.     No friction rub.   Pulmonary:      Effort: Pulmonary effort is normal. No respiratory distress.      Breath sounds: Normal breath sounds. No wheezing or rales.   Chest:      Chest wall: No tenderness.   Abdominal:      General: Bowel sounds are normal.      Palpations: Abdomen is soft.   Musculoskeletal:         General: Normal range of motion.      Cervical back: Normal range of motion and neck supple.   Skin:     General: Skin is warm and dry.      Capillary Refill: Capillary refill takes 2 to 3 seconds.   Neurological:      Mental Status: She is alert and oriented to person, place, and time.   Psychiatric:         Behavior: Behavior normal.         Thought Content: Thought content normal.         Judgment: Judgment normal.

## 2025-04-28 NOTE — TELEPHONE ENCOUNTER
Left message requesting return call letting Dr Felix know if it would be okay for Pt have a CTA Head?   Dx Headaches    Hx: Ear Surgery by Dr Christie Rico

## 2025-04-29 NOTE — TELEPHONE ENCOUNTER
Cecilia Palmer PA-C  Otolaryngology     All Conversations  (Oldest Message First)Cecilia Palmer PA-C   MY    4/28/25  2:39 PM  Note     Patient's pcp calls and wants to know if Josh can get a CTA head following surgery by Dr Kaur. I called and let them know that Dr. Kaur said she is able to get a CTA.

## 2025-04-30 DIAGNOSIS — R51.9 INCREASED FREQUENCY OF HEADACHES: Primary | ICD-10-CM

## 2025-05-05 ENCOUNTER — APPOINTMENT (OUTPATIENT)
Dept: LAB | Facility: CLINIC | Age: 30
End: 2025-05-05
Payer: COMMERCIAL

## 2025-05-05 DIAGNOSIS — R51.9 NONINTRACTABLE EPISODIC HEADACHE, UNSPECIFIED HEADACHE TYPE: ICD-10-CM

## 2025-05-05 DIAGNOSIS — G44.52 NEW DAILY PERSISTENT HEADACHE: ICD-10-CM

## 2025-05-05 LAB
ANION GAP SERPL CALCULATED.3IONS-SCNC: 12 MMOL/L (ref 4–13)
BASOPHILS # BLD AUTO: 0.03 THOUSANDS/ÂΜL (ref 0–0.1)
BASOPHILS NFR BLD AUTO: 0 % (ref 0–1)
BUN SERPL-MCNC: 11 MG/DL (ref 5–25)
CALCIUM SERPL-MCNC: 9.5 MG/DL (ref 8.4–10.2)
CHLORIDE SERPL-SCNC: 102 MMOL/L (ref 96–108)
CO2 SERPL-SCNC: 24 MMOL/L (ref 21–32)
CREAT SERPL-MCNC: 0.62 MG/DL (ref 0.6–1.3)
EOSINOPHIL # BLD AUTO: 0.05 THOUSAND/ÂΜL (ref 0–0.61)
EOSINOPHIL NFR BLD AUTO: 1 % (ref 0–6)
ERYTHROCYTE [DISTWIDTH] IN BLOOD BY AUTOMATED COUNT: 18.2 % (ref 11.6–15.1)
GFR SERPL CREATININE-BSD FRML MDRD: 123 ML/MIN/1.73SQ M
GLUCOSE SERPL-MCNC: 78 MG/DL (ref 65–140)
HCT VFR BLD AUTO: 34.4 % (ref 34.8–46.1)
HGB BLD-MCNC: 10.3 G/DL (ref 11.5–15.4)
IMM GRANULOCYTES # BLD AUTO: 0.02 THOUSAND/UL (ref 0–0.2)
IMM GRANULOCYTES NFR BLD AUTO: 0 % (ref 0–2)
LYMPHOCYTES # BLD AUTO: 1.48 THOUSANDS/ÂΜL (ref 0.6–4.47)
LYMPHOCYTES NFR BLD AUTO: 20 % (ref 14–44)
MCH RBC QN AUTO: 24.7 PG (ref 26.8–34.3)
MCHC RBC AUTO-ENTMCNC: 29.9 G/DL (ref 31.4–37.4)
MCV RBC AUTO: 83 FL (ref 82–98)
MONOCYTES # BLD AUTO: 0.51 THOUSAND/ÂΜL (ref 0.17–1.22)
MONOCYTES NFR BLD AUTO: 7 % (ref 4–12)
NEUTROPHILS # BLD AUTO: 5.27 THOUSANDS/ÂΜL (ref 1.85–7.62)
NEUTS SEG NFR BLD AUTO: 72 % (ref 43–75)
NRBC BLD AUTO-RTO: 0 /100 WBCS
PLATELET # BLD AUTO: 374 THOUSANDS/UL (ref 149–390)
PMV BLD AUTO: 11.2 FL (ref 8.9–12.7)
POTASSIUM SERPL-SCNC: 4.1 MMOL/L (ref 3.5–5.3)
RBC # BLD AUTO: 4.17 MILLION/UL (ref 3.81–5.12)
SODIUM SERPL-SCNC: 138 MMOL/L (ref 135–147)
WBC # BLD AUTO: 7.36 THOUSAND/UL (ref 4.31–10.16)

## 2025-05-05 PROCEDURE — 80048 BASIC METABOLIC PNL TOTAL CA: CPT | Performed by: FAMILY MEDICINE

## 2025-05-05 PROCEDURE — 85025 COMPLETE CBC W/AUTO DIFF WBC: CPT

## 2025-05-05 PROCEDURE — 36415 COLL VENOUS BLD VENIPUNCTURE: CPT | Performed by: FAMILY MEDICINE

## 2025-05-06 ENCOUNTER — RESULTS FOLLOW-UP (OUTPATIENT)
Dept: FAMILY MEDICINE CLINIC | Facility: CLINIC | Age: 30
End: 2025-05-06

## 2025-05-06 DIAGNOSIS — D50.9 MICROCYTIC HYPOCHROMIC ANEMIA: Primary | ICD-10-CM

## 2025-05-06 NOTE — RESULT ENCOUNTER NOTE
Call patient to notify normal results chemistry panel was normal but again the patient is anemic and does need an iron panel

## 2025-05-06 NOTE — RESULT ENCOUNTER NOTE
Please call the patient regarding her abnormal result.  She is anemic is she having problems with heavy menstrual periods or does she not eat vegetables like broccoli spinach that are rich in iron or is she having any rectal bleeding she is anemic and it looks like her iron levels are low I am going to put an order in for an iron panel to see if she is iron deficient if that is the case we will have to start her on iron pills.  The test is a fasting blood test and she can do it any day

## 2025-05-14 ENCOUNTER — TELEPHONE (OUTPATIENT)
Dept: FAMILY MEDICINE CLINIC | Facility: CLINIC | Age: 30
End: 2025-05-14

## 2025-05-14 NOTE — TELEPHONE ENCOUNTER
The prior authorization request for this study has not been approved.   A peer to peer can be scheduled by calling BRITTANIE @ [phone] and refer to TRACKING# [id number] with the deadline date of THURSDAY 05/15/2025.     PLEASE SEE INSURANCE DETERMINATION AND REQUIREMENTS:     [X] Does not meet Medical Necessity   [ ] No prior imaging   [ ] PT or conservative treatment incomplete   [ ] Missing Labs   [ ] Frequency     Your request was denied completely because:   We reviewed the medical information given by your doctor. Your doctor's records   say you have headaches. With what was received from your doctor, a decision   was made that this is not a reason for a(n) Brain CT Angiography. To approve, we   need notes from your doctor that say why they want this test. A different test   (brain MRI (Magnetic Resonance Imaging)) might work better for your problem. It   is our medical view that the Brain CT Angiography be denied as it is not medically   necessary.

## 2025-05-15 ENCOUNTER — TELEPHONE (OUTPATIENT)
Dept: FAMILY MEDICINE CLINIC | Facility: CLINIC | Age: 30
End: 2025-05-15

## 2025-05-15 DIAGNOSIS — R51.9 INCREASED FREQUENCY OF HEADACHES: Primary | ICD-10-CM

## 2025-05-15 DIAGNOSIS — R51.9 NONINTRACTABLE EPISODIC HEADACHE, UNSPECIFIED HEADACHE TYPE: ICD-10-CM

## 2025-05-15 DIAGNOSIS — G44.52 NEW DAILY PERSISTENT HEADACHE: ICD-10-CM

## 2025-06-30 ENCOUNTER — OFFICE VISIT (OUTPATIENT)
Age: 30
End: 2025-06-30
Attending: FAMILY MEDICINE
Payer: COMMERCIAL

## 2025-06-30 VITALS
BODY MASS INDEX: 28.85 KG/M2 | SYSTOLIC BLOOD PRESSURE: 110 MMHG | HEART RATE: 71 BPM | WEIGHT: 169 LBS | DIASTOLIC BLOOD PRESSURE: 70 MMHG | HEIGHT: 64 IN

## 2025-06-30 DIAGNOSIS — R51.0 POSITIONAL HEADACHE: ICD-10-CM

## 2025-06-30 DIAGNOSIS — G44.52 NEW DAILY PERSISTENT HEADACHE: ICD-10-CM

## 2025-06-30 DIAGNOSIS — R51.9 INCREASED FREQUENCY OF HEADACHES: ICD-10-CM

## 2025-06-30 DIAGNOSIS — G43.109 MIGRAINE WITH AURA AND WITHOUT STATUS MIGRAINOSUS, NOT INTRACTABLE: Primary | ICD-10-CM

## 2025-06-30 DIAGNOSIS — R51.9 NONINTRACTABLE EPISODIC HEADACHE, UNSPECIFIED HEADACHE TYPE: ICD-10-CM

## 2025-06-30 PROCEDURE — 99245 OFF/OP CONSLTJ NEW/EST HI 55: CPT | Performed by: PSYCHIATRY & NEUROLOGY

## 2025-06-30 RX ORDER — TOPIRAMATE 25 MG/1
TABLET, FILM COATED ORAL
Qty: 120 TABLET | Refills: 1 | Status: SHIPPED | OUTPATIENT
Start: 2025-06-30

## 2025-06-30 RX ORDER — SUMATRIPTAN 50 MG/1
50 TABLET, FILM COATED ORAL AS NEEDED
Qty: 9 TABLET | Refills: 2 | Status: SHIPPED | OUTPATIENT
Start: 2025-06-30

## 2025-06-30 NOTE — PROGRESS NOTES
Neurology New Patient Ambulatory Visit Note    Name: Josh Berkowitz       : 1995       MRN: [id number]   Encounter Provider: Reji Clinton MD   Encounter Date: 2025  Encounter department: [facility] NEUROLOGY [facility]    History of Present Illness     Josh Berkowitz is a 30 y.o. female coming in for headaches since childhood, which initially improved following a tympanic mastoidectomy on the left side performed due to headaches and dizziness. However, the headaches have worsened since late January of this year.    The headaches are described as a dull pain located behind the eyes, radiating down the neck, and can sometimes be throbbing, reaching a severity of 7 out of 10 at its worst. She occurs daily, typically lasting no longer than an hour, and are most severe upon waking in the morning, improving as the day progresses. No associated nausea, vomiting, or sensitivity to light and sound, but she experiences brief blackouts upon sitting up quickly after waking. Movement, such as bending over, can make her head feel heavy.    She has not identified any specific triggers for the headaches, such as stress or lack of sleep, and do not experience an aura. Ibuprofen is used for relief, but no other medications have been tried recently. She recalls using Topamax as a child after her first mastoidectomy but do not remember its effectiveness.    She was diagnosed with migraines as a child, but are not aware of any family history of migraines. She denies any history of head or neck trauma, loss of consciousness, or exertion-related triggers. She consumes a significant amount of water daily, sleep 7 to 10 hours per night, and have a daily intake of two shots of espresso. She do not use birth control or consume alcohol regularly.    Headache Description:    Onset of headaches: gradual several years back, but they got worse at the start of the year.   Location of headaches: bilateral and  retro-orbital  Radiation: Yes, neck region   Quality of the pain: dull and throbbing  Intensity of the headache: At present: 0/10;  At its worst:  7/10  Duration of the headaches:hour(s)  Frequency of the headaches:daily  Presence of aura:  Yes, visual aura  Associated symptoms with headaches: no nausea , no vomiting , no light sensitivity , and no sound sensitivity   Triggers:No   Positional component: Yes   Alleviating factors: Yes, Ibuprofen    Any specific time of the day when get the headaches: early morning    Red Flags for headache:  Age <5 or >50: No  First worst headaches: No  Maximal at intensity: variable.   Change in pattern: Yes  New headache in pregnancy, cancer or immunocompromised patient: No  Loss of consciousness or convulsions: No  Headache triggered by exertion, Valsalva maneuver or sexual activity: No  Abnormal exam: please see below in Neurological examination.    Social history:   Family history of migraine headaches: No  History of neck and head trauma: No  Smoking status: prior smoker   Oral contraceptive use: No      Life style factors:  -Hydration: adequate  -Sleep: adequate  -Caffeine intake: 2 cups of caffeinated coffee per day(s)  -Alcohol intake: does not drink       Treatment:  -Over the counter medications tried for headaches:   Ibuprofen      -Prescription medications:  Abortive or Rescue Medications used:   No rescue medicine tried in the past      Preventative or daily medications used for headaches:   Topamax ~tried when she was in 2 grade            Prior notes:   Patient, 24 years of age and right-handed, presents for further evaluation regarding what has been described as “dizziness.”  By dizziness she is not describing true vertigo.  She describes the feeling as somewhat lightheaded while at the same time feeling as though her brain is in a “fog.”  She is in full contact with the environment with no loss of awareness or consciousness.  She can continue whenever she is doing.   "These have been recurrent over the past 9-10 months.  She will experience on occasion multiple episodes during the day, each lasting generally less than a minute.  She describes the episodes as occurring when she is stationary and generally when she is sitting.  No orthostatic component.  No other positional component.  She does have a significant ENT history with an apparent radical left mastoidectomy in 2013. She does have hearing issues, more so on the left.  She recently re-initiated evaluation and follow-up with ENT.  I was able to review those records.  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  Some suggestion that her episodes are ear related.  Patient relates she feels her episodes are less frequent since re-initiation of treatment by ENT.  Patient is scheduled by ENT for an upcoming audiogram.  Patient denies any prior history of seizure or syncope.  Reports no sleep issues and describes herself as awakening in the mornings refreshed.  Objective   /70 (BP Location: Left arm, Patient Position: Sitting, Cuff Size: Adult)   Pulse 71   Ht 5' 4\" (1.626 m)   Wt 76.7 kg (169 lb)   BMI 29.01 kg/m²        Neurological examination:     Mental status exam: Alert, oriented to time place and person, normal recent memory, normal attention spans and normal concentration.      Cranial nerve exam:    I Not tested   II Normal visual fields to finger counting.   II, Ill,  IV, VI Pupils were symmetric, briskly reactive. No afferent pupillary defect.  Eye movements are full without nystagmus. No ptosis.   V Facial sensation were intact   VII Facial movements were symmetrical    VIII Hearing was intact   IX, X Intact   XI Shoulder shrug and head turn was normal   XII Tongue protrusion is in the mid line.          Motor exam      Arm Right  Left Leg Right  Left   Deltoid " "5/5 5/5 lliopsoas 5/5 5/5   Biceps 5/5 5/5 Quads 5/5 5/5   Triceps 5/5 5/5 Hamstrings 5/5 5/5   Wrist Extension 5/5 5/5 Ankle Dorsi Flexion 5/5 5/5   Wrist Flexion 5/5 5/5 Ankle Plantar Flexion 5/5 5/5            Deep Tendon reflexes:       Brachioradialis Biceps Triceps Patellar Achilles   Right 2+ 2+ 2+ 3+ 1+   Left 2+ 2+ 2+ 3+ 2+            Sensory exam:  Sensation to dull touch Intact  Sensation to temperature Intact    Coordination:  Normal finger to nose testing  Finger tapping test was normal    Gait and Station:    normal        Pertinent diagnostic testing:  Labs:  Lab Results   Component Value Date    WBC 7.36 05/05/2025     Lab Results   Component Value Date    HGB 10.3 (L) 05/05/2025     Lab Results   Component Value Date     05/05/2025     Lab Results   Component Value Date    LYMPHSABS 1.48 05/05/2025     No results found for: \"LABLYMP\"  Lab Results   Component Value Date    EOSABS 0.05 05/05/2025     No components found for: \"NEUTROPHILS\"    No results found for: \"LABMONO\"         No results found for: \"LABGLUC\"  Lab Results   Component Value Date    CREATININE 0.62 05/05/2025       Lab Results   Component Value Date    AST 31 10/15/2022     Lab Results   Component Value Date    ALT 49 10/15/2022     Lab Results   Component Value Date    ALKPHOS 81 10/15/2022     Lab Results   Component Value Date    AST 31 10/15/2022        No results found for: \"FOLATE\"  No results found for: \"QFHLURQT31\"  No results found for: \"COPPER\"  No results found for: \"METHYLMALON\"  No results found for: \"VITAMINB6\"  No components found for: \"PTEMIGBF9RO\"  No components found for: \"VITAMINE\"  No components found for: \"ANADIRECT\"     No components found for: \"HIVPCR\"     No components found for: \"GLUCOSECSF\"  No components found for: \"CSFINTERP\"  No results found for: \"RBCCSF\"  No results found for: \"WBCCSF\"  No results found for: \"IGGSYNRATE\"  No components found for: \"IGGINDEX\"  No results found for: \"PROTEINCSF\"  No " "components found for: \"CSFMONO\"  No components found for: \"FUNGUSCX\"  No components found for: \"CSFCS\"  No results found for: \"CRYPTOAG\"  No components found for: \"DRLCSF\"  No components found for: \"FLOWCYTEVAL\"     Lab Results   Component Value Date    HEPBSAG Non-reactive 04/12/2021    HEPBSAB 58.38 04/12/2021    HEPCAB Non-reactive 04/12/2021            Neuroimaging:   Results for orders placed during the hospital encounter of 02/26/20    MRI brain wo contrast    Impression  1.  Postsurgical changes of the left temporal bone without acute intracranial findings.    2.  If surgical changes of the temporal bone are suspected to relate to the patient's dizziness, noncontrast temporal bone ct would offer better evaluation of the vestibulocochlear structures.    Workstation performed: [id number]                         Results for orders placed during the hospital encounter of 04/13/23    CT head without contrast    Impression  No acute intracranial abnormality.            Workstation performed: [id number]                    Assessment & Plan  Migraine with aura and without status migrainosus, not intractable  Ms. Berkowitz is a very pleasant 30 y.o. female presenting with Chronic headaches with worsening since the beginning of the year, located behind the eyes and radiating down the neck, described as dull and sometimes throbbing, with severity up to 7/10. Occur daily, primarily in the morning, improving as the day progresses. No associated nausea, vomiting, or light/sound sensitivity. No aura, but experiences transient visual blackouts upon waking. Previous MRI in 2020 was unremarkable. Differential includes migraine given positional component and morning onset, but atypical due to lack of classic migraine symptoms. History of tympanic mastoidectomy on the left side previously alleviated symptoms. MRI indicated to rule out other causes due to worsening symptoms and positional component.    - Order MRI of the brain " without contrast to rule out other causes.  - Prescribe Topamax (topiramate) starting at 25 mg daily, increasing weekly to a target dose of 100 mg as tolerated. Monitor for side effects such as paresthesias, brain fog, or speech problems.  - Prescribe Imitrex (sumatriptan) as a rescue medication for acute headache episodes.  - Provide information on potential headache triggers, including dietary and environmental factors.  - Schedule follow-up in a few months to assess treatment efficacy and side effects.    Orders:    topiramate (Topamax) 25 mg tablet; Week 1: Start with 25 mg at bed time. Week 2: Take 25 mg in the morning and 25 mg in the evening Week 3: Take 25 mg in the morning and 50 mg in the evening Week 4: Take 50 mg in the morning and 50 mg in the evening.    SUMAtriptan (Imitrex) 50 mg tablet; Take 1 tablet (50 mg total) by mouth if needed for migraine (Take one tab at the start of the headache, if no relief can repeat the dose in 2 hours but not more than 3 doses in 24 hours.) for up to 1 dose    Positional headache  Please see above   Orders:    MRI brain wo contrast; Future          Ms. Berkowitz will Return in about 4 months (around 10/30/2025), or telemedicine.    Administrative Statements   The management plan was discussed in detail with the patient and all questions were answered.     Ms. Berkowitz was encouraged to contact our office with any questions or concerns and to contact the clinic or go to the nearest emergency room if symptoms change or worsen.     I have spent a total time of 58 minutes in caring for this patient on the day of the visit/encounter including Diagnostic results, Prognosis, Risks and benefits of tx options, Instructions for management, Patient and family education, Importance of tx compliance, Risk factor reductions, Impressions, Counseling / Coordination of care, Documenting in the medical record, Reviewing/placing orders in the medical record (including tests, medications,  and/or procedures), and Obtaining or reviewing history  .         Reji Miramontes MD.   Staff Neurologist,   Neuroimmunology and Neuroinfectious disease  06/30/25     This report has been created through the use of voice recognition/text compilation software.  Typographical and content errors may occur with this process.  While efforts are made to detect and correct such errors, in some cases errors will persist.  For this reason, wording in this document should be considered in the proper context and not strictly verbatim.  If, when reviewing the document, an error is discovered, please let the office know at [phone]

## 2025-06-30 NOTE — PATIENT INSTRUCTIONS
"Headache Preventive Treatment:   Please keep in mind that it takes 4-6 weeks for the medication to start working well and 2-3 months at the appropriate dose before deciding if it will be useful or not. If it is not helping at all by this time, then we will discuss other medications to try. Supplements may take 3-6 months until you see full effect.     HEADACHE DIET: Foods and beverages which may trigger migraine  Note that only 20% of headache patients are food sensitive. You will know if you are food sensitive if you get a headache consistently 20 minutes to 2 hours after eating a certain food. Only cut out a food if it causes headaches, otherwise you might remove foods you enjoy! What matters most for diet is to eat a well balanced healthy diet full of vegetables and low fat protein, and to not miss meals.    Chocolate, other sweets    ALL cheeses except cottage and cream cheese    Dairy products, yogurt, sour cream, ice cream    Liver    Meat extracts (Bovril, Marmite, meat tenderizers)    Meats or fish which have undergone aging, fermenting, pickling or smoking. These include: Hotdogs,salami,Lox,sausage,  mortadellas,smoked salmon, pepperoni, Pickled herring    Pods of broad bean (English beans, Chinese pea pods, Italian (steffen) beans, lima and navy beans    Ripe avocado, ripe banana    Yeast extracts or active yeast preparations such as Diaz's or Lien's (commercial bakes goods are permitted)    Tomato based foods, pizza (lasagna, etc.)    MSG (monosodium glutamate) is disguised as many things; look for these common aliases:  · Monopotassium glutamate  · Autolysed yeast  · Hydrolysed protein  · Sodium caseinate  · “flavorings”  · “all natural preservatives\"    Nutrasweet    Avoid all other foods that convincingly provoke headaches.    Headache Prevention Strategies:    1. Maintain a headache diary; learn to identify and avoid triggers. Common triggers include:    Emotional triggers:  · Emotional/Upset " family or friends  · Emotional/Upset occupation  · Business reversal/success  · Anticipation anxiety  · Crisis-serious  · Post-crisis periodNew job/position    Physical triggers:  · Vacation Day  · Weekend  · Strenuous Exercise  · [place] Location  · New Move  · Menstrual Day  · Physical Illness  · Oversleep/Not enough sleep  · Weather changes  · Light: Photophobia or light sesnitivity treatment involves a balance between desensitization and reduction in overly strong input. Use dark polarized glasses outside, but not inside. Avoid bright or fluorescent light, but do not dim environment to the point that going into a normally lit room hurts. Consider FL-41 tint lenses, which reduce the most irritating wavelengths without blocking too much light. These can be obtained at [employer] or [employer]  · Foods: see list above.    2. Limit use of acute treatments (over-the-counter medications, triptans, etc.) to no more than 2 days per week or 10 days per month to prevent medication overuse headache (rebound headache).     3. Follow a regular schedule (including weekends and holidays):  · Don't skip meals. Eat a balanced diet.  · 8 hours of sleep nightly.  · Minimize stress.  · Exercise 30 minutes per day. Being overweight is associated with a 5 times increased risk of chronic migraine.  · Keep well hydrated and drink 6-8 glasses of water per day.    4. Initiate non-pharmacologic measures at the earliest onset of your headache.  · Rest and quiet environment.  · Relax and reduce stress. [id number] is a free parminder that can instruct you on some simple relaxtion and breathing techniques. Http://[employer] is a free website that provides teaching videos on relaxation. Also, there are many apps that can be downloaded for “mindful” relaxation. An parminder called YOGA NIDRA will help walk you through mindfulness.  · Cold compresses.    5. Don't wait!! Take the maximum allowable dosage of prescribed medication at the  first sign of migraine.    6. Compliance: Take prescribed medication regularly as directed and at the first sign of a migraine.    7. Communicate: Call your physician when problems arise, especially if your headaches change, increase in frequency/severity, or become associated with neurological symptoms (weakness, numbness, slurred speech, etc.).    8. Headache/pain management therapies: Consider various complementary methods, including medication, behavioral therapy, psychological counselling, biofeedback, massage therapy, acupuncture, dry needling, and other modalities. Such measures may reduce the need for medications. Counseling for pain management, where patients learn to function and ignore/minimize their pain, seems to work very well.    9. Recommend changing family's attention and focus away from patient's headaches. Instead, emphasize daily activities. If first question of day is 'How are your headaches/Do you have a headache today?', then patient will constantly think about headaches, thus making them worse. Goal is to re-direct attention away from headaches, toward daily activities and other distractions.    10. Helpful Websites:  www.AmericanHeadacheSociety.org  www.migrainetrust.org  www.headaches.org  www.migraine.org.uk  www.achenet.org    11. HEADACHE EXPECTATIONS:  There are many types of headaches, and only a rare few in which complete relief can be expected. In general, there is no cure for headache, especially migraine based headaches. There is nothing available that completely prevents headaches from occurring, breaking through, or having periodic flare-ups and fluctuations. Regardless of what you are using on a daily basis for prevention, episodic headaches should still be expected, and periods where frequency may escalate and fluctuate are unavoidable.    There is no quick fix for most headaches. Furthermore, the longer you have had high frequency headaches (such as chronic daily headache),  "the longer it will likely take to expect any improvement. In fact, some people will never improve, regardless of how many medications or other treatments we try. Our treatment strategy is to evaluate for possible causes of your headache, although testing is usually always normal, even in cases of daily continuous headaches for years. Most types of headache such as migraine are electrical brain disorders (similar to how epilepsy is an electrical brain disorders). Therefore, there is no testing that will reveal this \"dysfunctional electrical circuitry\" such on MRI, or other testing. We try to find a medication that may help lessen the frequency and/or severity of your headaches. The goal is not to completely stop them from happening, although if that happens, great! Different people respond to different medications, and some people just don't respond to anything, so it's usually a matter of trying different options. We can not predict if or when exactly you will respond to a treatment that we provide.    Preventive headache medications take 4-6 weeks to start working, and 2-3 months to see full effect, assuming you reach an effective dose. Therefore, calling or messaging frequently because you have a headache flare prior to the 3 month jace is unlikely to change anything, and unfortunately there is nothing available that will expedite this, so please try to avoid this. Our recommendation will generally be to give it adequate time first. If you are unable to wait it out for medications to work, we can also try IV infusions for some temporary relief.     In general, the best that preventive medications or other treatments (including Botox) are able to offer in migraine management (variable in other headache types) is a 50% improvement in frequency and/or severity of headache. That is our goal, and any additional benefit is considered a bonus. Some people do significantly better than this, others do not get close to " this. Therefore, if your headaches are not improving by at least 3 months on your preventive strategy, contact us and we can discuss further adjustments. Keep in mind that complete headache cure is not a realistic expectation.    Our Team:  The nursing staff, and medical assistants are a major part of YOUR TREATMENT TEAM and will be handling your phone calls and inquiries, if any. Unless explicitly told otherwise at the time of your office visit, your study results and ensuing treatment plans will be released via Classana and discussed during your follow-up appointment. Follow-up appointments are primarily provided by the Nurse Practioners and Physician’s Assistants in order to provide timely, accessible care.     MyChart: Please ask the schedulers to give you an activation code. The main way of communication is by Edifilmhart rather than phone lines, so if you have not signed up, please do so. Autoniqt is also the way that you can review your labs and testing. We are not able to contact everyone to tell them results are normal. If you do not hear back from us regarding testing you have had, it should be considered normal or within normal range. If you have any questions about the results, you are free to message us.    Autoniqt is meant for simple questions regarding medications, possible side effects, or other simple straight forward questions in limited sentences, rather than multiple paragraphs of discussion. Classana is not meant for, or efficient for these complex questions, extensive questions, extensive medication adjustments, complex new symptoms or concerns. These issues beyond simple questions require a follow up visit with myself, one of our physician assistants, nurse practitioners, or a Virtual Visit via computer or smart phone, as detailed further down.    Refills:  Please pay attention to when your refills will need to be renewed. Due to the volume of phone calls daily, this could potentially take a few  days, although we certainly try to honor your refill requests as soon as we can. You should call at least 1 week in advance of needing a refill to ensure you do not run out of medication. Keep in mind that refill requests on Fridays may not be filled until the following week.     The [facility]ache [facility] Section does not complete disability or any other insurance-related forms/documention. We will complete FMLA forms. All of the office notes, study results, and other pertinent documentation generated as part of your evaluation will be available to you and to your Primary Care Physician (PCP). Use of this material to complete such forms will be at the discretion of your PCP/referring physician.          Lifestyle adjustments. Irrespective of treatment modalities applied, trigger control and lifestyle modification are indispensable to the successful management of migraine. This is especially important in children, adolescents, or pregnant women where drug treatments must be especially limited.    Although there is no robust evidence for most of the recommendations, most are general health measures that, given the lack of adverse effects and the benefit for general well-being, we consider should be recommended in all patients.    Avoid triggers. Many patients attribute the onset or worsening of pain to specific triggers such as stress, sleep changes, food, or atmospheric changes, among others. It is even possible that the relationship occurs inversely, so that premonitory symptoms such as sleep disturbances and appetite 48 to 72 hours before the onset of pain can be misinterpreted by the patient as the trigger of migraine attacks. The therapeutic implications of this relationship are also unclear. There are possible triggers such as sleep deprivation, fasting, or certain foods that can be easily avoidable. But avoiding other triggers can lead to very restrictive lifestyles with a reduction in quality of life that  does not outweigh the potential beneficial.    Sleep. Another complex relationship is headache and sleep. An excess or lack of sleep can trigger migraine attacks and at the same time rest is one of the most used treatments to improve the symptoms of the migraine attack. Additionally, migraine and other headaches occur comorbidly with sleep disorders. Patients with chronic migraine have a higher prevalence of sleep disorders, specifically poor sleep habits and nonrestorative rest.    Regarding general sleep measures for patients with headache, it is recommended to define regular sleep schedules that allow 8 hours of rest per day, insisting that they remain constant also during the weekend; have dinner 4 hours before bedtime and avoid liquids in the last two hours; and eliminate naps and avoid using screens, television, reading, or listening to music in bed. A nonpharmacological intervention to improve sleep habits can improve headache frequency and even reverse chronic to episodic migraine.    Diet. In the scientific literature, but especially in the informative websites and magazines, multiple and varied diets are proposed that aim to reduce the frequency of headaches. There are two main approaches: elimination diets, which consist of suppressing potentially triggering foods such as chocolate, alcohol, cheese, nuts, or citrus fruits and diets that provide high or low amounts of certain components, ie, rich in vitamin B12, B6, or D or low in histamine, lactose, or fatty acids. The studies are not very rigorous and most do not have a control group (). In addition, it must be considered that food triggers were only associated with onset of headache in less than 10% of the participants.    Dietary recommendations for patients with migraine should be the same as for the general population with special emphasis on the prevention of obesity, which is a factor related to headache chronification. It is recommendable to have  a varied diet, eating five meals a day to avoid periods of prolonged fasting and incorporating water intake to reach around 2.5 liters per day, which should be increased in case of physical activity or increase in temperature or humidity. Specific diets should be recommended solely based on whether there are other comorbidities in the patient.    Caffeine at moderate doses (< 400mg/day: equivalent to two cups of coffee) does not seem to have a negative effect on headache frequency although it should be taken regularly to avoid withdrawal headaches.    Although patients with migraine headaches and cluster headaches may be more susceptible to alcohol as a precipitant, there is no evidence to recommend abstinence from alcohol in all patients. Individual predisposition and cultural factors must be considered.    Exercise. Aerobic exercise can prevent or reduce symptoms of multiple chronic diseases, including headache. With some methodological limitations, there are studies that demonstrate benefits of aerobic exercise as a therapeutic intervention to reduce the frequency and intensity of headaches, as well as the quality of life measured by questionnaires. Exercise can have a beneficial effect on headaches directly but also indirectly, improving sleep quality, mood, cardiovascular function, and preventing weight gain. In addition, it can improve the control of other diseases frequently comorbid with headache such as obesity, hypertension, anxiety, depression, or sleep disorders (). The clinical benefit of yoga as an add-on therapy in patients with episodic migraine has been demonstrated.               Natural supplements for headache:  Magnesium Oxide 500 mg at bed  Coenzyme Q10 300 mg in AM  Vitamin B2- 200 mg twice a day  Feverfew 50 mg twice a day    Vitamins and herbs that show potential    Magnesium: Magnesium (250 mg twice a day or 500 mg at bed) has a relaxant effect on smooth muscles such as blood vessels.  Individuals suffering from frequent or daily headache usually have low magnesium levels which can be increase with daily supplementation of 400-750 mg. Three trials found 40-90% average headache reduction when used as a preventative. Magnesium also demonstrated the benefit in menstrually related migraine. Magnesium is part of the messenger system in the serotonin cascade and it is a good muscle relaxant. It is also useful for constipation which can be a side effect of other medications used to treat migraine. Good sources include nuts, whole grains, and tomatoes.    Riboflavin (vitamin B 2) 200 mg twice a day. This vitamin assists nerve cells in the production of ATP a principal energy storing molecule. It is necessary for many chemical reactions in the body. There have been at least 3 clinical trials of riboflavin using 400 mg per day all of which suggested that migraine frequency can be decreased. All 3 trials showed significant improvement in over half of migraine sufferers. The supplement is found in bread, cereal, milk, meat, and poultry. Most Americans get more riboflavin than the recommended daily allowance, however riboflavin deficiency is not necessary for the supplements to help prevent headache.    Feverfew: Feverfew is a common garden herb native to [place] and popular [facility] as a treatment for disorders typically controlled by aspirin. The mechanism of action is unknown but is believed to be related to a chemical called parthenolide which helps the body use serotonin more effectively. Serotonin helps prevent migraine and assists with resolution when it occurs. Parthenolide also inhibits the release of histamine which is linked to pain and inflammation.    Consistency of active ingredients in different products can be a problem. Some formulations don't have the active ingredient (parthenolide) that prevents migraine. A parthenolide content of 0.2% is generally recommended. Typical dosage is one  capsule 3 times a day.    Coenzyme Q10: This is present in almost all cells in the body and is critical component for the conversion of energy. Recent studies have shown that a nutritional supplement of CoQ10 can reduce the frequency of migraine attacks by improving the energy production of cells as with riboflavin. Doses of 150 mg twice a day have been shown to be effective.    Melatonin: Increasing evidence shows correlation between melatonin secretion and headache conditions. Melatonin supplementation has decreased headache intensity and duration. It is widely used as a sleep aid. Sleep is natures way of dealing with migraine. A dose of 3 mg is recommended to start for headaches including cluster headache. Higher doses up to 15 mg has been reviewed for use in Cluster headache and have been used.  The rationale behind using melatonin for cluster is that many theories regarding the cause of Cluster headache center around the disruption of the normal circadian rhythm in the brain. This helps restore the normal circadian rhythm.    Sheila: Sheila has a small amount of antihistamine and anti-inflammatory action which may help headache. It is primarily used for nausea and may aid in the absorption of other medications.       Topamax/Topiramate is an oral pill which is used for migraine headaches: We typical start it at 25 mg and then increase it slowly over the course of several weeks. Can cause following side effects including numbness, tingling, weight loss, no recommended if you are trying to get pregnant, specific kidney stone issues.     Elavil/Pamelor is an oral pill. It is used for migraine and tension type headaches. We typical start it at a low dose of 10 mg and increase it to 20 mg. Common side effects include Fatigue-Dry mouth-Weight gain-Constipation- Drowsiness. It can also help with mood.     Propranolol/Beta Blockers is an oral pill.  It is started around 40 mg every day and increased accordingly. Common  side effects not used if you have underlying asthma. Can cause low pulse and blood pressure.     Depakote is a pill which is used for migraine headaches and seizures. Usually once or twice a day. Common side effects include the following Nausea--Tiredness--Tremor--Dizziness--Weight gain--Hair loss--Birth defects    QULIPTA is an oral prescription medicine used for the preventive treatment of migraine in adults-Common side effects include, allergic reaction. If you have any kidney problems, liver problems, or plan to get pregnant than it is not recommended.       Ajovy is an injection, which is given once a month under you skin: AJOVY may cause allergic reactions, including itching, rash, and hives that can happen within hours and up to 1 month after receiving AJOVY. Call your healthcare provider or get emergency medical help right away if you have any symptoms of an allergic reaction: swelling of your face, mouth, tongue, throat, or if you have trouble breathing. Talk to your doctor about stopping AJOVY if you have an allergic reaction.    Aimovig is an injection, which is given once a month under you skin. The common side effects include Allergic reactions, including rash or swelling can happen after receiving Aimovig®. This can happen within hours to days after using Aimovig®. Call your HCP or get emergency medical help right away if you have any of the following symptoms of an allergic reaction: swelling of the face, mouth, tongue or throat, or trouble breathing. Constipation with serious complications. Severe constipation can happen after receiving Aimovig®. In some cases people have been hospitalized or needed surgery. Contact your HCP if you have severe constipation or constipation associated with symptoms such as severe or constant belly pain, vomiting, swelling of belly or bloating.High blood pressure. High blood pressure or worsening of high blood pressure can happen after receiving Aimovig®. Contact your  healthcare provider if you have an increase in blood pressure.    Emgality is an injection, which is given once a month under you skin. The initial does is 2 injections and then it is one injection a month there after. Common side effects include allergic reactions, such as itching, rash, hives, and trouble breathing. Allergic reactions can happen days after using Emgality. Call your healthcare provider or get emergency medical help right away if you have any of the following symptoms, which may be part of an allergic reaction: swelling of your face, mouth, tongue, or throat, or trouble breathing. Common side effects--The most common side effects of Emgality are injection site reactions.

## 2025-07-04 PROBLEM — G43.109 MIGRAINE WITH AURA AND WITHOUT STATUS MIGRAINOSUS, NOT INTRACTABLE: Status: ACTIVE | Noted: 2025-07-04

## 2025-07-04 NOTE — ASSESSMENT & PLAN NOTE
Ms. Berkowitz is a very pleasant 30 y.o. female presenting with Chronic headaches with worsening since the beginning of the year, located behind the eyes and radiating down the neck, described as dull and sometimes throbbing, with severity up to 7/10. Occur daily, primarily in the morning, improving as the day progresses. No associated nausea, vomiting, or light/sound sensitivity. No aura, but experiences transient visual blackouts upon waking. Previous MRI in 2020 was unremarkable. Differential includes migraine given positional component and morning onset, but atypical due to lack of classic migraine symptoms. History of tympanic mastoidectomy on the left side previously alleviated symptoms. MRI indicated to rule out other causes due to worsening symptoms and positional component.    - Order MRI of the brain without contrast to rule out other causes.  - Prescribe Topamax (topiramate) starting at 25 mg daily, increasing weekly to a target dose of 100 mg as tolerated. Monitor for side effects such as paresthesias, brain fog, or speech problems.  - Prescribe Imitrex (sumatriptan) as a rescue medication for acute headache episodes.  - Provide information on potential headache triggers, including dietary and environmental factors.  - Schedule follow-up in a few months to assess treatment efficacy and side effects.    Orders:    topiramate (Topamax) 25 mg tablet; Week 1: Start with 25 mg at bed time. Week 2: Take 25 mg in the morning and 25 mg in the evening Week 3: Take 25 mg in the morning and 50 mg in the evening Week 4: Take 50 mg in the morning and 50 mg in the evening.    SUMAtriptan (Imitrex) 50 mg tablet; Take 1 tablet (50 mg total) by mouth if needed for migraine (Take one tab at the start of the headache, if no relief can repeat the dose in 2 hours but not more than 3 doses in 24 hours.) for up to 1 dose

## 2025-07-09 ENCOUNTER — HOSPITAL ENCOUNTER (OUTPATIENT)
Dept: MRI IMAGING | Facility: HOSPITAL | Age: 30
Discharge: HOME | End: 2025-07-09
Attending: PSYCHIATRY & NEUROLOGY
Payer: COMMERCIAL

## 2025-07-09 DIAGNOSIS — R51.0 POSITIONAL HEADACHE: ICD-10-CM

## 2025-07-09 PROCEDURE — 70551 MRI BRAIN STEM W/O DYE: CPT

## 2025-08-21 ENCOUNTER — OFFICE VISIT (OUTPATIENT)
Dept: FAMILY MEDICINE CLINIC | Facility: CLINIC | Age: 30
End: 2025-08-21
Payer: COMMERCIAL

## 2025-08-21 VITALS
SYSTOLIC BLOOD PRESSURE: 104 MMHG | HEART RATE: 72 BPM | HEIGHT: 64 IN | TEMPERATURE: 96.9 F | BODY MASS INDEX: 25.95 KG/M2 | WEIGHT: 152 LBS | DIASTOLIC BLOOD PRESSURE: 60 MMHG | OXYGEN SATURATION: 99 %

## 2025-08-21 DIAGNOSIS — L20.9 ATOPIC DERMATITIS, UNSPECIFIED TYPE: Primary | ICD-10-CM

## 2025-08-21 PROCEDURE — 99214 OFFICE O/P EST MOD 30 MIN: CPT | Performed by: FAMILY MEDICINE

## 2025-08-21 RX ORDER — PREDNISONE 10 MG/1
TABLET ORAL
Qty: 17 TABLET | Refills: 0 | Status: SHIPPED | OUTPATIENT
Start: 2025-08-21

## 2025-08-21 RX ORDER — MOMETASONE FUROATE 1 MG/G
CREAM TOPICAL DAILY
Qty: 15 G | Refills: 0 | Status: SHIPPED | OUTPATIENT
Start: 2025-08-21

## (undated) DEVICE — BLADE MYRINGOTOMY 377121

## (undated) DEVICE — CURITY PLAIN PACKING STRIP: Brand: CURITY

## (undated) DEVICE — TOOL MR8-7BA30D MR8 7CM BALL D 3MM: Brand: MIDAS REX MR8

## (undated) DEVICE — STERILE CUP W/ LID, 2 OZ 2 PK       067: Brand: CARDINAL HEALTH

## (undated) DEVICE — TOOL MR8-7BA60F MR8 7CM BALL FINE 6MM: Brand: MIDAS REX MR8

## (undated) DEVICE — STRL PENROSE DRAIN 18" X 1/4": Brand: CARDINAL HEALTH

## (undated) DEVICE — DRAPE SURGIKIT SADDLE BAG

## (undated) DEVICE — BIPOLAR CORD DISP

## (undated) DEVICE — GLOVE SRG BIOGEL 6.5

## (undated) DEVICE — ADHESIVE SKIN HIGH VISCOSITY EXOFIN 1ML

## (undated) DEVICE — SUT CHROMIC 3-0 SH 27 IN G122H

## (undated) DEVICE — PROXIMATE PLUS MD MULTI-DIRECTIONAL RELEASE SKIN STAPLERS CONTAINS 35 STAINLESS STEEL STAPLES APPROXIMATE CLOSED DIMENSIONS: 6.9MM X 3.9MM WIDE: Brand: PROXIMATE

## (undated) DEVICE — TUBING IRD800 MR8 IRRIGTION ON-DRILL 5PK: Brand: MIDAS REX MR8

## (undated) DEVICE — DRAPE CRANI

## (undated) DEVICE — IV CATH INTROCAN 18G X 1 1/4 SAFETY

## (undated) DEVICE — PROXIMATE SKIN STAPLERS (35 WIDE) CONTAINS 35 STAINLESS STEEL STAPLES (FIXED HEAD): Brand: PROXIMATE

## (undated) DEVICE — SUT PLAIN 5-0 PC-1 18 IN 1915G

## (undated) DEVICE — STERILE EAR PACK: Brand: CARDINAL HEALTH

## (undated) DEVICE — DRESSING EAR GLASSCOCK ADULT LRG

## (undated) DEVICE — SURGIFOAM 8.5 X 12.5

## (undated) DEVICE — TELFA ADHESIVE ISLAND DRESSING: Brand: TELFA

## (undated) DEVICE — ELECTRODE 8227410 PAIRED 2 CH SET ROHS

## (undated) DEVICE — TOOL MR8-7BA40L MR8 7CM BALL LONG 4MM: Brand: MIDAS REX MR8